# Patient Record
Sex: FEMALE | Race: WHITE | NOT HISPANIC OR LATINO | Employment: OTHER | URBAN - METROPOLITAN AREA
[De-identification: names, ages, dates, MRNs, and addresses within clinical notes are randomized per-mention and may not be internally consistent; named-entity substitution may affect disease eponyms.]

---

## 2019-05-17 ENCOUNTER — TRANSCRIBE ORDERS (OUTPATIENT)
Dept: ADMINISTRATIVE | Facility: HOSPITAL | Age: 69
End: 2019-05-17

## 2019-05-17 DIAGNOSIS — S83.241A ACUTE MEDIAL MENISCUS TEAR OF RIGHT KNEE, INITIAL ENCOUNTER: Primary | ICD-10-CM

## 2019-05-18 ENCOUNTER — HOSPITAL ENCOUNTER (OUTPATIENT)
Dept: RADIOLOGY | Facility: HOSPITAL | Age: 69
Discharge: HOME | End: 2019-05-18
Payer: MEDICARE

## 2019-05-18 DIAGNOSIS — S83.241A ACUTE MEDIAL MENISCUS TEAR OF RIGHT KNEE, INITIAL ENCOUNTER: ICD-10-CM

## 2019-05-18 PROCEDURE — 73721 MRI JNT OF LWR EXTRE W/O DYE: CPT

## 2019-06-27 ENCOUNTER — ANESTHESIA EVENT (OUTPATIENT)
Dept: GASTROENTEROLOGY | Facility: AMBULARY SURGERY CENTER | Age: 69
End: 2019-06-27

## 2019-07-12 ENCOUNTER — HOSPITAL ENCOUNTER (OUTPATIENT)
Dept: GASTROENTEROLOGY | Facility: AMBULARY SURGERY CENTER | Age: 69
Setting detail: OUTPATIENT SURGERY
Discharge: HOME | End: 2019-07-12
Attending: COLON & RECTAL SURGERY | Admitting: COLON & RECTAL SURGERY
Payer: MEDICARE

## 2019-07-12 ENCOUNTER — ANESTHESIA (OUTPATIENT)
Dept: GASTROENTEROLOGY | Facility: AMBULARY SURGERY CENTER | Age: 69
End: 2019-07-12

## 2019-07-12 VITALS
HEIGHT: 69 IN | RESPIRATION RATE: 18 BRPM | BODY MASS INDEX: 21.03 KG/M2 | WEIGHT: 142 LBS | OXYGEN SATURATION: 98 % | DIASTOLIC BLOOD PRESSURE: 79 MMHG | TEMPERATURE: 98.1 F | SYSTOLIC BLOOD PRESSURE: 162 MMHG | HEART RATE: 64 BPM

## 2019-07-12 DIAGNOSIS — Z12.11 SCREENING FOR COLON CANCER: ICD-10-CM

## 2019-07-12 PROCEDURE — 88305 TISSUE EXAM BY PATHOLOGIST: CPT | Performed by: PATHOLOGY

## 2019-07-12 PROCEDURE — 99203 OFFICE O/P NEW LOW 30 MIN: CPT | Performed by: COLON & RECTAL SURGERY

## 2019-07-12 PROCEDURE — 45384 COLONOSCOPY W/LESION REMOVAL: CPT | Performed by: COLON & RECTAL SURGERY

## 2019-07-12 PROCEDURE — 1123F ACP DISCUSS/DSCN MKR DOCD: CPT | Performed by: PATHOLOGY

## 2019-07-12 RX ORDER — PROPOFOL 10 MG/ML
INJECTION, EMULSION INTRAVENOUS AS NEEDED
Status: DISCONTINUED | OUTPATIENT
Start: 2019-07-12 | End: 2019-07-12 | Stop reason: SURG

## 2019-07-12 RX ORDER — ASCORBIC ACID 500 MG
500 TABLET ORAL DAILY
COMMUNITY

## 2019-07-12 RX ORDER — CHOLECALCIFEROL (VITAMIN D3) 125 MCG
500 CAPSULE ORAL DAILY
COMMUNITY

## 2019-07-12 RX ORDER — OMEGA-3S/DHA/EPA/FISH OIL/D3 300MG-1000
400 CAPSULE ORAL DAILY
COMMUNITY

## 2019-07-12 RX ORDER — LIDOCAINE HYDROCHLORIDE 10 MG/ML
0.5 INJECTION, SOLUTION EPIDURAL; INFILTRATION; INTRACAUDAL; PERINEURAL ONCE AS NEEDED
Status: DISCONTINUED | OUTPATIENT
Start: 2019-07-12 | End: 2019-07-16 | Stop reason: HOSPADM

## 2019-07-12 RX ORDER — SODIUM CHLORIDE, SODIUM LACTATE, POTASSIUM CHLORIDE, CALCIUM CHLORIDE 600; 310; 30; 20 MG/100ML; MG/100ML; MG/100ML; MG/100ML
125 INJECTION, SOLUTION INTRAVENOUS CONTINUOUS
Status: DISCONTINUED | OUTPATIENT
Start: 2019-07-12 | End: 2019-07-16 | Stop reason: HOSPADM

## 2019-07-12 RX ADMIN — PROPOFOL 50 MG: 10 INJECTION, EMULSION INTRAVENOUS at 08:02

## 2019-07-12 RX ADMIN — PROPOFOL 50 MG: 10 INJECTION, EMULSION INTRAVENOUS at 07:57

## 2019-07-12 RX ADMIN — PROPOFOL 30 MG: 10 INJECTION, EMULSION INTRAVENOUS at 08:16

## 2019-07-12 RX ADMIN — PROPOFOL 50 MG: 10 INJECTION, EMULSION INTRAVENOUS at 07:59

## 2019-07-12 RX ADMIN — PROPOFOL 50 MG: 10 INJECTION, EMULSION INTRAVENOUS at 08:05

## 2019-07-12 RX ADMIN — PROPOFOL 50 MG: 10 INJECTION, EMULSION INTRAVENOUS at 07:55

## 2019-07-12 RX ADMIN — SODIUM CHLORIDE, SODIUM LACTATE, POTASSIUM CHLORIDE, AND CALCIUM CHLORIDE: .6; .31; .03; .02 INJECTION, SOLUTION INTRAVENOUS at 07:17

## 2019-07-12 RX ADMIN — PROPOFOL 50 MG: 10 INJECTION, EMULSION INTRAVENOUS at 07:53

## 2019-07-12 RX ADMIN — PROPOFOL 50 MG: 10 INJECTION, EMULSION INTRAVENOUS at 08:09

## 2019-07-12 NOTE — H&P
History and Physical   Colon and Rectal Surgery   Becky Rossi 78 y o  female MRN: [id number]  Unit/Bed#:  Encounter: [id number]  07/12/19   7:44 AM      No chief complaint on file  History of Present Illness   HPI:  Becky Rossi is a 78 y o  female who presents with screening colonoscopy, last exam over 12 years ago, with normal findings         Historical Information   Past Medical History:   Diagnosis Date    Asthma     Infectious viral hepatitis     history Hep A     Past Surgical History:   Procedure Laterality Date    APPENDECTOMY      BACK SURGERY      HYSTERECTOMY      JOINT REPLACEMENT Left     x2    TONSILLECTOMY         Meds/Allergies       (Not in a hospital admission)      Current Outpatient Medications:     Apoaequorin (PREVAGEN PO), Take by mouth, Disp: , Rfl:     ascorbic acid (VITAMIN C) 500 mg tablet, Take 500 mg by mouth daily, Disp: , Rfl:     Boswellia Santos (BOSWELLIA PO), Take by mouth, Disp: , Rfl:     cholecalciferol (VITAMIN D3) 400 units tablet, Take 400 Units by mouth daily, Disp: , Rfl:     conjugated estrogens (PREMARIN) 0 625 mg tablet, Take 0 625 mg by mouth daily Take daily for 21 days then do not take for 7 days  , Disp: , Rfl:     cyanocobalamin (VITAMIN B-12) 500 mcg tablet, Take 500 mcg by mouth daily, Disp: , Rfl:     Misc Natural Products (CURCUMAX PRO PO), Take by mouth, Disp: , Rfl:     Multiple Vitamins-Minerals (SUPER OMAR MARIANO 75/BETA BRANDON PO), Take by mouth, Disp: , Rfl:     Phosphatidylserine (NEURO-PS PO), Take by mouth, Disp: , Rfl:     Current Facility-Administered Medications:     lactated ringers infusion, 125 mL/hr, Intravenous, Continuous, Lashell Jefferson MD    lidocaine (PF) (XYLOCAINE-MPF) 1 % injection 0 5 mL, 0 5 mL, Infiltration, Once PRN, Lashell Jefferson MD    Allergies   Allergen Reactions    Iodine     Tetanus Toxoids     Vancomycin Itching         Social History   Social History     Substance and Sexual Activity   Alcohol Use Yes    Frequency: 4 or more times a week    Drinks per session: 1 or 2     Social History     Substance and Sexual Activity   Drug Use Never     Social History     Tobacco Use   Smoking Status Never Smoker   Smokeless Tobacco Never Used         Family History: History reviewed  No pertinent family history  Objective     Current Vitals:   Blood Pressure: 146/75 (07/12/19 0727)  Pulse: 73 (07/12/19 0727)  Temperature: (!) 97 1 °F (36 2 °C) (07/12/19 0727)  Temp Source: Temporal (07/12/19 0727)  Respirations: 18 (07/12/19 0727)  Height: 5' 9" (175 3 cm) (07/12/19 0727)  Weight - Scale: 64 4 kg (142 lb) (07/12/19 0727)  SpO2: 97 % (07/12/19 0727)  No intake or output data in the 24 hours ending 07/12/19 0744    Physical Exam:  General: No acute distress  Eyes: Normal   ENT: Normal   Neck: No JVD  Pulm: Normal in A&P  CV: NSR no murmur  Abdomen: Soft and normal on palpation, no mass, no tenderness, no guarding  Rectal: Normal sphincter tone, no perianal skin lesions  Extremities: Normal  Lymphatics: Normal        Lab Results: I have personally reviewed pertinent lab results  Imaging: I have personally reviewed pertinent reports  Patient was consented by myself for procedure as explained earlier with all the risks and benefits described  All questions answered  ASSESSMENT:  Tangela Fuentes is a 78 y o  female who presents with screening colonoscopy, last exam over 12 years ago, with normal findings        PLAN:  colonoscopy

## 2019-07-12 NOTE — ANESTHESIA POSTPROCEDURE EVALUATION
Post-Op Assessment Note    CV Status:  Stable  Pain Score: 0    Pain management: satisfactory to patient     Mental Status:  Alert   Hydration Status:  Stable   PONV Controlled:  Controlled   Airway Patency:  Patent   Post Op Vitals Reviewed: Yes      Staff: CRNA           BP      Temp     Pulse     Resp      SpO2

## 2019-07-12 NOTE — ANESTHESIA PREPROCEDURE EVALUATION
Review of Systems/Medical History          Cardiovascular   Pulmonary  Asthma ,        GI/Hepatic    Liver disease , Hepatitis ,             Endo/Other     GYN       Hematology   Musculoskeletal       Neurology   Psychology           Physical Exam    Airway    Mallampati score: II         Dental   No notable dental hx     Cardiovascular      Pulmonary      Other Findings        Anesthesia Plan  ASA Score- 2     Anesthesia Type- IV sedation with anesthesia with ASA Monitors  Additional Monitors:   Airway Plan:     Comment: I, Dr Juliet Zhang, the attending physician, have personally seen and evaluated the patient prior to anesthetic care  I have reviewed the pre-anesthetic record, and other medical records if appropriate to the anesthetic care  If a CRNA is involved in the case, I have reviewed the CRNA assessment, if present, and agree  The patient is in a suitable condition to proceed with my formulated anesthetic plan        Plan Factors-    Induction- intravenous  Postoperative Plan-     Informed Consent- Anesthetic plan and risks discussed with patient  I personally reviewed this patient with the CRNA  Discussed and agreed on the Anesthesia Plan with the CRNA  Jackie Patel

## 2019-08-20 ENCOUNTER — EVALUATION (OUTPATIENT)
Dept: PHYSICAL THERAPY | Facility: CLINIC | Age: 69
End: 2019-08-20
Payer: MEDICARE

## 2019-08-20 ENCOUNTER — TRANSCRIBE ORDERS (OUTPATIENT)
Dept: PHYSICAL THERAPY | Facility: CLINIC | Age: 69
End: 2019-08-20

## 2019-08-20 DIAGNOSIS — Z96.651 TOTAL KNEE REPLACEMENT STATUS, RIGHT: Primary | ICD-10-CM

## 2019-08-20 DIAGNOSIS — M25.561 ACUTE PAIN OF RIGHT KNEE: ICD-10-CM

## 2019-08-20 PROCEDURE — 97162 PT EVAL MOD COMPLEX 30 MIN: CPT | Performed by: PHYSICAL THERAPIST

## 2019-08-20 PROCEDURE — 97110 THERAPEUTIC EXERCISES: CPT | Performed by: PHYSICAL THERAPIST

## 2019-08-20 NOTE — PROGRESS NOTES
PT Evaluation     Today's date: 2019  Patient name: Mahi Soto  : 1950  MRN: [id number]  Referring provider: Arabella Brown MD  Dx:   Encounter Diagnosis     ICD-10-CM    1  Total knee replacement status, right Z96 651    2  Acute pain of right knee M25 561        Start Time: 1355  Stop Time: 1440  Total time in clinic (min): 45 minutes    Assessment  Assessment details: Mahi Soto is a pleasant 78 y o  female who presents with R TKR performed on 19, who has been doing home PT prior to starting in outpatient  The patient's greatest concerns are fear of not being able to keep active  No further referral appears necessary at this time based upon examination results  Primary movement impairment diagnosis of decreased R knee flexion ROM resulting in pathoanatomical symptoms of Total knee replacement status, right  (primary encounter diagnosis)  Acute pain of right knee and limiting her ability to exercise or recreation, get off the toilet, get out of a chair, perform household chores, perform yard work, sleep, squat to  objects from the floor, stand and walk  Impairments include:  1) R knee ROM limitation  2) Decreased neuromuscular control of quad and glutes  3) Weakness in bilateral hips    Etiologic factors include poor lower extremity strength, poor hip mobility and poor balance  Discussed risks, benefits, and alternatives to treatment, and answered all patient questions to patient satisfaction    Impairments: abnormal gait, abnormal muscle firing, abnormal muscle tone, abnormal or restricted ROM, abnormal movement, impaired balance, impaired physical strength, lacks appropriate home exercise program, pain with function and poor posture   Understanding of Dx/Px/POC: good   Prognosis: good  Prognosis details: Positive prognostic indicators include positive attitude toward recovery, good understanding of diagnosis and treatment plan options and absence of observed red flags   Negative prognostic indicators include chronicity of symptoms, minimal changes in pain with movement and multiple concurrent orthopedic problems  Goals  Impairment Goals 4-6 weeks  - Decrease pain to <3/10  - Improve knee AROM to equal to the unaffected lower extremity  - Increase knee strength to 4+/5 throughout  - Increase hip strength to 4+/5 throughout    Functional Goals 6-8 weeks  - Return to Prior Level of Function  - Increase Functional Status Measure (FOTO) to: 59  - Patient will be independent with HEP  - Patient will be able to squat without increased pain/compensation/difficulty  - Patient will be able to perform sit to stand without increased pain/compensation/difficulty   - Patient will be able to ascend and descend stairs without increased pain/compensation/difficulty   - Patient will be able to return to gardening without difficulty      Plan  Plan details: Prognosis above is given PT services 2x/week tapering to 1x/week over the next 3 months and home program adherence    Patient would benefit from: skilled physical therapy  Planned modality interventions: cryotherapy, electrical stimulation/Russian stimulation, high voltage pulsed current: pain management, high voltage pulsed current: spasm management, H-Wave, TENS, thermotherapy: hydrocollator packs and unattended electrical stimulation  Planned therapy interventions: abdominal trunk stabilization, behavior modification, body mechanics training, breathing training, flexibility, functional ROM exercises, home exercise program, joint mobilization, manual therapy, massage, Long taping, muscle pump exercises, neuromuscular re-education, patient education, postural training, strengthening, stretching, therapeutic activities, therapeutic exercise, therapeutic training, gait training, transfer training, balance and balance/weight bearing training  Frequency: 2x week  Duration in weeks: 8  Treatment plan discussed with: patient        Subjective Evaluation    History of Present Illness  Mechanism of injury: WORK/SCHOOL: Retired  and   HOME LIFE: Patient lives with her  at home and 2 dogs, 3 chickens and 8 fish  2 MONSE, bed and bath on same floor but exercise room in basement and office upstairs  HOBBIES/EXERCISE: Patient enjoys gardening, playing piano, knitting, voice lessons, and a  that comes to the home  PLOF: L knee replacement in the past  Has foot drop on the R with brace at age 27  HISTORY OF CURRENT INJURY:  Conformist knee created in [place] directly based on her CT scan  Patient had R TKR on 19  Patient had home health PT for 4 visits and she did the HEP at home  Patient has been using elliptical, lunges for knee flexion, marches standing, and squats  PAIN LOCATION/DESCRIPTORS: Pain located in superior aspect of the knee, where she has a possible stitch abscess  She is washing it with dial every day and watching it closely  Pain located mostly in the medial knee and some on the lateral knee  Stiffness after prolonged positioning  AGGRAVATING FACTORS: Descending stairs, walking, getting out of a chair, and getting in and out of the car, driving, positioning at night, prolonged positioning  EASES: Biofreeze, ice  DAY PATTERN: Worst at night, is not sleeping well  Also tired by late afternoon  IMAGING: X-ray, MRI, and CT prior to surgery  SPECIAL QUESTIONS:    Carolina Huang denies a new onset of Bowel/bladder dysfunction, Nausea, Clumsy or unsteadiness, Constant night pain and History of cancer  Patient has lost 10 lbs since the surgery due to loss of appetite  PATIENT GOALS: Patient wishes to improve her motion in her knee, in order to get through daily life without discomfort     Pain  Current pain rating: 3  At best pain ratin  At worst pain ratin  Quality: dull ache and tight  Progression: improved    Patient Goals  Patient goals for therapy: decreased pain, improved balance, increased motion, increased strength and independence with ADLs/IADLs          Objective     Neurological Testing     Sensation     Knee   Left Knee   Intact: light touch    Right Knee   Intact: light touch     Active Range of Motion   Left Knee   Flexion: 128 degrees   Extension: 0 degrees     Right Knee   Flexion: 108 degrees with pain  Extension: 0 degrees     Strength/Myotome Testing     Left Hip   Planes of Motion   Flexion: 4  External rotation: 4  Internal rotation: 4    Right Hip   Planes of Motion   Flexion: 4-  External rotation: 4-  Internal rotation: 4    Left Knee   Flexion: 5  Extension: 5  Quadriceps contraction: good    Right Knee   Flexion: 4-  Extension: 4  Quadriceps contraction: fair    Left Ankle/Foot   Dorsiflexion: 5    Swelling     Left Knee Girth Measurement (cm)   Joint line: 35 5 cm  10 cm above joint line: 36 cm    Right Knee Girth Measurement (cm)   Joint line: 39 5 cm  10 cm above joint line: 39 cm    General Comments:      Lumbar Comments  Ambulation: TurboMed R sided AFO, equal stride length, no trendelenberg, minimal trunk sway  TU seconds no AD  30 sec STS: using hands 9 reps, R knee held into extension    Knee Comments  Patient with edema and slight warmth in R knee, with well healed scar with top of scar slightly red without drainage         Flowsheet Rows      Most Recent Value   PT/OT G-Codes   Current Score  45   Projected Score  64   FOTO information reviewed  Yes        Diagnosis: R TKR 19   Precautions: R foot drop, chronic, AFO   Asterisks next to exercises = provided for patient HEP   Manual Therapy        Patellar mobs        Tibiofemoral mobs        Scar massage                        Exercise Diary         Bike 5 min for ROM       Heel slides        Lunge for knee flx        Quad ramps        4 way SLR        Bridges         [facility]        Standing hip abd/ext        SLS        Gait training         Biodex        TKE Modalities        Ice         TENS

## 2019-08-20 NOTE — LETTER
2019    Logan Bustamante MD  [employer]  [facility] [address]    Patient: Tangela Fuentes   YOB: 1950   Date of Visit: 2019     Encounter Diagnosis     ICD-10-CM    1  Total knee replacement status, right Z96 651    2  Acute pain of right knee M25 561        Dear Dr Dias Tejas: Thank you for your recent referral of Tangela Fuentes  Please review the attached evaluation summary from Aurelia's recent visit  Please verify that you agree with the plan of care by signing the attached order  If you have any questions or concerns, please do not hesitate to call  I sincerely appreciate the opportunity to share in the care of one of your patients and hope to have another opportunity to work with you in the near future  Sincerely,    Silvino Cooper, PT      Referring Provider:      I certify that I have read the below Plan of Care and certify the need for these services furnished under this plan of treatment while under my care  Logan Bustamante MD  [facility] [address]  VIA Facsimile: [phone]          PT Evaluation     Today's date: 2019  Patient name: Tangela Fuentes  : 1950  MRN: [id number]  Referring provider: Wilfred Mireles MD  Dx:   Encounter Diagnosis     ICD-10-CM    1  Total knee replacement status, right Z96 651    2  Acute pain of right knee M25 561        Start Time: 1355  Stop Time: 1440  Total time in clinic (min): 45 minutes    Assessment  Assessment details: Tangela Fuentes is a pleasant 78 y o  female who presents with R TKR performed on 19, who has been doing home PT prior to starting in outpatient  The patient's greatest concerns are fear of not being able to keep active  No further referral appears necessary at this time based upon examination results      Primary movement impairment diagnosis of decreased R knee flexion ROM resulting in pathoanatomical symptoms of Total knee replacement status, right  (primary encounter diagnosis)  Acute pain of right knee and limiting her ability to exercise or recreation, get off the toilet, get out of a chair, perform household chores, perform yard work, sleep, squat to  objects from the floor, stand and walk  Impairments include:  1) R knee ROM limitation  2) Decreased neuromuscular control of quad and glutes  3) Weakness in bilateral hips    Etiologic factors include poor lower extremity strength, poor hip mobility and poor balance  Discussed risks, benefits, and alternatives to treatment, and answered all patient questions to patient satisfaction  Impairments: abnormal gait, abnormal muscle firing, abnormal muscle tone, abnormal or restricted ROM, abnormal movement, impaired balance, impaired physical strength, lacks appropriate home exercise program, pain with function and poor posture   Understanding of Dx/Px/POC: good   Prognosis: good  Prognosis details: Positive prognostic indicators include positive attitude toward recovery, good understanding of diagnosis and treatment plan options and absence of observed red flags  Negative prognostic indicators include chronicity of symptoms, minimal changes in pain with movement and multiple concurrent orthopedic problems      Goals  Impairment Goals 4-6 weeks  - Decrease pain to <3/10  - Improve knee AROM to equal to the unaffected lower extremity  - Increase knee strength to 4+/5 throughout  - Increase hip strength to 4+/5 throughout    Functional Goals 6-8 weeks  - Return to Prior Level of Function  - Increase Functional Status Measure (FOTO) to: 59  - Patient will be independent with HEP  - Patient will be able to squat without increased pain/compensation/difficulty  - Patient will be able to perform sit to stand without increased pain/compensation/difficulty   - Patient will be able to ascend and descend stairs without increased pain/compensation/difficulty   - Patient will be able to return to gardening without difficulty      Plan  Plan details: Prognosis above is given PT services 2x/week tapering to 1x/week over the next 3 months and home program adherence  Patient would benefit from: skilled physical therapy  Planned modality interventions: cryotherapy, electrical stimulation/Russian stimulation, high voltage pulsed current: pain management, high voltage pulsed current: spasm management, H-Wave, TENS, thermotherapy: hydrocollator packs and unattended electrical stimulation  Planned therapy interventions: abdominal trunk stabilization, behavior modification, body mechanics training, breathing training, flexibility, functional ROM exercises, home exercise program, joint mobilization, manual therapy, massage, Long taping, muscle pump exercises, neuromuscular re-education, patient education, postural training, strengthening, stretching, therapeutic activities, therapeutic exercise, therapeutic training, gait training, transfer training, balance and balance/weight bearing training  Frequency: 2x week  Duration in weeks: 8  Treatment plan discussed with: patient        Subjective Evaluation    History of Present Illness  Mechanism of injury: WORK/SCHOOL: Retired  and   HOME LIFE: Patient lives with her  at home and 2 dogs, 3 chickens and 8 fish  2 MONSE, bed and bath on same floor but exercise room in basement and office upstairs  HOBBIES/EXERCISE: Patient enjoys gardening, playing piano, knitting, voice lessons, and a  that comes to the home  PLOF: L knee replacement in the past  Has foot drop on the R with brace at age 27  HISTORY OF CURRENT INJURY:  Conformist knee created in [place] directly based on her CT scan  Patient had R TKR on 7/22/19  Patient had home health PT for 4 visits and she did the HEP at home   Patient has been using elliptical, lunges for knee flexion, marches standing, and squats  PAIN LOCATION/DESCRIPTORS: Pain located in superior aspect of the knee, where she has a possible stitch abscess  She is washing it with dial every day and watching it closely  Pain located mostly in the medial knee and some on the lateral knee  Stiffness after prolonged positioning  AGGRAVATING FACTORS: Descending stairs, walking, getting out of a chair, and getting in and out of the car, driving, positioning at night, prolonged positioning  EASES: Biofreeze, ice  DAY PATTERN: Worst at night, is not sleeping well  Also tired by late afternoon  IMAGING: X-ray, MRI, and CT prior to surgery  SPECIAL QUESTIONS:    Veronica White denies a new onset of Bowel/bladder dysfunction, Nausea, Clumsy or unsteadiness, Constant night pain and History of cancer  Patient has lost 10 lbs since the surgery due to loss of appetite  PATIENT GOALS: Patient wishes to improve her motion in her knee, in order to get through daily life without discomfort     Pain  Current pain rating: 3  At best pain ratin  At worst pain ratin  Quality: dull ache and tight  Progression: improved    Patient Goals  Patient goals for therapy: decreased pain, improved balance, increased motion, increased strength and independence with ADLs/IADLs          Objective     Neurological Testing     Sensation     Knee   Left Knee   Intact: light touch    Right Knee   Intact: light touch     Active Range of Motion   Left Knee   Flexion: 128 degrees   Extension: 0 degrees     Right Knee   Flexion: 108 degrees with pain  Extension: 0 degrees     Strength/Myotome Testing     Left Hip   Planes of Motion   Flexion: 4  External rotation: 4  Internal rotation: 4    Right Hip   Planes of Motion   Flexion: 4-  External rotation: 4-  Internal rotation: 4    Left Knee   Flexion: 5  Extension: 5  Quadriceps contraction: good    Right Knee   Flexion: 4-  Extension: 4  Quadriceps contraction: fair    Left Ankle/Foot   Dorsiflexion: 5    Swelling     Left Knee Girth Measurement (cm)   Joint line: 35 5 cm  10 cm above joint line: 36 cm    Right Knee Girth Measurement (cm)   Joint line: 39 5 cm  10 cm above joint line: 39 cm    General Comments:      Lumbar Comments  Ambulation: TurboMed R sided AFO, equal stride length, no trendelenberg, minimal trunk sway  TU seconds no AD  30 sec STS: using hands 9 reps, R knee held into extension    Knee Comments  Patient with edema and slight warmth in R knee, with well healed scar with top of scar slightly red without drainage         Flowsheet Rows      Most Recent Value   PT/OT G-Codes   Current Score  45   Projected Score  64   FOTO information reviewed  Yes        Diagnosis: R TKR 19   Precautions: R foot drop, chronic, AFO   Asterisks next to exercises = provided for patient HEP   Manual Therapy        Patellar mobs        Tibiofemoral mobs        Scar massage                        Exercise Diary         Bike 5 min for ROM       Heel slides        Lunge for knee flx        Quad ramps        4 way SLR        Bridges         Clamshells        Standing hip abd/ext        SLS        Gait training         Biodex        TKE                                                                        Modalities        Ice         TENS

## 2019-08-23 ENCOUNTER — OFFICE VISIT (OUTPATIENT)
Dept: PHYSICAL THERAPY | Facility: CLINIC | Age: 69
End: 2019-08-23
Payer: MEDICARE

## 2019-08-23 DIAGNOSIS — M25.561 RECURRENT PAIN OF RIGHT KNEE: ICD-10-CM

## 2019-08-23 DIAGNOSIS — M25.561 ACUTE PAIN OF RIGHT KNEE: ICD-10-CM

## 2019-08-23 DIAGNOSIS — Z96.651 TOTAL KNEE REPLACEMENT STATUS, RIGHT: Primary | ICD-10-CM

## 2019-08-23 PROCEDURE — 97110 THERAPEUTIC EXERCISES: CPT | Performed by: PHYSICAL THERAPIST

## 2019-08-23 NOTE — PROGRESS NOTES
Daily Note     Today's date: 2019  Patient name: Chang Evans  : 1950  MRN: [id number]  Referring provider: Nancy Fall MD  Dx:   Encounter Diagnosis     ICD-10-CM    1  Total knee replacement status, right Z96 651    2  Recurrent pain of right knee M25 561    3  Acute pain of right knee M25 561        Start Time: 0705  Stop Time: 0750  Total time in clinic (min): 45 minutes    Subjective: Patient reports that she had a massage yesterday and did not feel great afterwards  Objective: See treatment diary below     Diagnosis: R TKR 19   Precautions: R foot drop, chronic, AFO   Asterisks next to exercises = provided for patient HEP   Manual Therapy       Patellar mobs        Tibiofemoral mobs        Scar massage                        Exercise Diary        Bike 5 min for ROM 5 min for ROM      Heel slides  X 30 5 sec holds 120      Lunge for knee flx  2 min 5 sec holds      Quad ramps  0-100% x 10      4 way SLR        Bridges         Clamshells  Green 3x10      Standing hip abd/ext        SLS        Gait training         Biodex        TKE        Prone quad stretch  3x30 sec      Hamstring stretch chair  3x30 sec                                                      Modalities        Ice         ESTIM                  Assessment: Tolerated treatment well and was challenged by exercises, with improvement in knee flexion to 120 degrees wth heel slides  Patient demonstrated fatigue post treatment, exhibited good technique with therapeutic exercises and was provided a new HEP to progress ROM, muscle length, and quad activation  Patient demonstrates impairment of the LE and weakness due to atrophy of the quads and hamstrings  This has lead to a decline of functional activity and limited ability to complete functional activity and mobility  Examination shows the nerves to the muscles are intact and atrophy is present in these areas   The patient will benefit from a home ESTIM unit for treatment compliance in order to address muscle re-education (atrophy), weakness, and increase ROM  Plan: Continue per plan of care

## 2019-08-27 ENCOUNTER — OFFICE VISIT (OUTPATIENT)
Dept: PHYSICAL THERAPY | Facility: CLINIC | Age: 69
End: 2019-08-27
Payer: MEDICARE

## 2019-08-27 DIAGNOSIS — M25.561 RECURRENT PAIN OF RIGHT KNEE: ICD-10-CM

## 2019-08-27 DIAGNOSIS — Z96.651 TOTAL KNEE REPLACEMENT STATUS, RIGHT: Primary | ICD-10-CM

## 2019-08-27 DIAGNOSIS — M25.561 ACUTE PAIN OF RIGHT KNEE: ICD-10-CM

## 2019-08-27 PROCEDURE — 97112 NEUROMUSCULAR REEDUCATION: CPT | Performed by: PHYSICAL THERAPIST

## 2019-08-27 PROCEDURE — 97140 MANUAL THERAPY 1/> REGIONS: CPT | Performed by: PHYSICAL THERAPIST

## 2019-08-27 PROCEDURE — 97110 THERAPEUTIC EXERCISES: CPT | Performed by: PHYSICAL THERAPIST

## 2019-08-27 NOTE — PROGRESS NOTES
Daily Note     Today's date: 2019  Patient name: Keith Ulloa  : 1950  MRN: [id number]  Referring provider: Wenceslao Macedo MD  Dx:   Encounter Diagnosis     ICD-10-CM    1  Total knee replacement status, right Z96 651    2  Recurrent pain of right knee M25 561    3  Acute pain of right knee M25 561        Start Time: 0815  Stop Time: 0900  Total time in clinic (min): 45 minutes    Subjective: Patient reports her knee has been good, but she continues to get limited sleep due to discomfort  Patient has noticed some nausea and stomach pain since the surgery  Patient has some dry mouth as well  Objective: See treatment diary below    Diagnosis: R TKR 19   Precautions: R foot drop, chronic, AFO   Asterisks next to exercises = provided for patient HEP   Manual Therapy      Patellar mobs   All directions grade IV     Tibiofemoral mobs   ER in supine     Scar massage        VMO facilitation   R side x 10, improved VMO             Exercise Diary       Bike 5 min for ROM 5 min for ROM 5 min for ROM seat 15     Heel slides  X 30 5 sec holds 120 X 30 5 sec holds 124     Lunge for knee flx  2 min 5 sec holds      Quad ramps  0-100% x 10 0-100% 2x10     4 way SLR*   2x10 with quad set     Bridges         Clamshells*  Green 3x10      Standing hip abd/ext        SLS        Gait training         Biodex        TKE*   All on wall 2x15 5 sec hold     Prone quad stretch*  3x30 sec      Hamstring stretch chair  3x30 sec                                                      Modalities        Ice         ESTIM                    Assessment: Tolerated treatment well and was able to improve knee flx ROM to 124 during heel slides  Patient continues to struggle with quad ramps when decreasing activation from 50 to 25 to 0%  Patient demonstrated fatigue post treatment and demonstrate improved VMO activation following facilitation and will perform ball press into wall for carryover      Plan: Progress treatment as tolerated

## 2019-08-30 ENCOUNTER — OFFICE VISIT (OUTPATIENT)
Dept: PHYSICAL THERAPY | Facility: CLINIC | Age: 69
End: 2019-08-30
Payer: MEDICARE

## 2019-08-30 DIAGNOSIS — Z96.651 TOTAL KNEE REPLACEMENT STATUS, RIGHT: Primary | ICD-10-CM

## 2019-08-30 DIAGNOSIS — M25.561 ACUTE PAIN OF RIGHT KNEE: ICD-10-CM

## 2019-08-30 DIAGNOSIS — M25.561 RECURRENT PAIN OF RIGHT KNEE: ICD-10-CM

## 2019-08-30 PROCEDURE — 97112 NEUROMUSCULAR REEDUCATION: CPT | Performed by: PHYSICAL THERAPIST

## 2019-08-30 PROCEDURE — 97140 MANUAL THERAPY 1/> REGIONS: CPT | Performed by: PHYSICAL THERAPIST

## 2019-08-30 PROCEDURE — 97110 THERAPEUTIC EXERCISES: CPT | Performed by: PHYSICAL THERAPIST

## 2019-08-30 NOTE — PROGRESS NOTES
Daily Note     Today's date: 2019  Patient name: Fredia Fleischer  : 1950  MRN: [id number]  Referring provider: Santiago Ford MD  Dx:   Encounter Diagnosis     ICD-10-CM    1  Total knee replacement status, right Z96 651    2  Recurrent pain of right knee M25 561    3  Acute pain of right knee M25 561        Start Time: 0700  Stop Time: 0748  Total time in clinic (min): 48 minutes    Subjective: Patient reports that she is still not sleeping well, and is hoping the new ESTIM unit will help her with this  Objective: See treatment diary below    Diagnosis: R TKR 19   Precautions: R foot drop, chronic, AFO   Asterisks next to exercises = provided for patient HEP   Manual Therapy     Patellar mobs   All directions grade IV     Tibiofemoral mobs   ER in supine     Scar massage        VMO facilitation   R side x 10, improved VMO             Exercise Diary      Bike 5 min for ROM 5 min for ROM 5 min for ROM seat 15 5 min for ROM seat    Heel slides  X 30 5 sec holds 120 X 30 5 sec holds 124 X 30 5 sec holds OP for 10 125 degrees, with ESTIM unit in place x 10 128 degrees    Lunge for knee flx  2 min 5 sec holds      Quad ramps  0-100% x 10 0-100% 2x10 0-100% 2x10    4 way SLR*   2x10 with quad set     Bridges         Clamshells*  Green 3x10      Standing hip abd/ext    2x10 marvin abd and ext, standing on 2" step for ext    SLS        Gait training         Biodex        TKE*   All on wall 2x15 5 sec hold     Prone quad stretch*  3x30 sec      Hamstring stretch chair  3x30 sec      Step ups    Stepping down with L 4" x 10, 6" x 5    Squats                                                                ESTIM unit education    Performed     RE-evaluation        Modalities        Ice         ESTIM    10 min during heel slides                Assessment: Tolerated treatment well and was able to improve knee flx ROM to 125 this date   ESTIM unit provided this date and education performed about its use and settings  Patient trialed the unit this date with positive results, improving knee flx ROM by 3 degrees with less pain during heel slides  Patient demonstrated fatigue post treatment and exhibited good technique with therapeutic exercises  Added hip abd/ext for home  Plan: Progress treatment as tolerated

## 2019-09-03 ENCOUNTER — OFFICE VISIT (OUTPATIENT)
Dept: PHYSICAL THERAPY | Facility: CLINIC | Age: 69
End: 2019-09-03
Payer: MEDICARE

## 2019-09-03 DIAGNOSIS — M25.561 ACUTE PAIN OF RIGHT KNEE: ICD-10-CM

## 2019-09-03 DIAGNOSIS — M25.561 RECURRENT PAIN OF RIGHT KNEE: ICD-10-CM

## 2019-09-03 DIAGNOSIS — Z96.651 TOTAL KNEE REPLACEMENT STATUS, RIGHT: Primary | ICD-10-CM

## 2019-09-03 PROCEDURE — 97110 THERAPEUTIC EXERCISES: CPT | Performed by: PHYSICAL THERAPIST

## 2019-09-03 PROCEDURE — 97112 NEUROMUSCULAR REEDUCATION: CPT | Performed by: PHYSICAL THERAPIST

## 2019-09-03 PROCEDURE — 97140 MANUAL THERAPY 1/> REGIONS: CPT | Performed by: PHYSICAL THERAPIST

## 2019-09-03 NOTE — PROGRESS NOTES
Daily Note     Today's date: 9/3/2019  Patient name: Kellen Galloway  : 1950  MRN: [id number]  Referring provider: Kody Chavarria MD  Dx:   Encounter Diagnosis     ICD-10-CM    1  Total knee replacement status, right Z96 651    2  Recurrent pain of right knee M25 561    3  Acute pain of right knee M25 561        Start Time: 0900  Stop Time: 09  Total time in clinic (min): 42 minutes    Subjective: Patient reports she is "coming along" and is using the ESTIM machine often       Objective: See treatment diary below     Diagnosis: R TKR 19   Precautions: R foot drop, chronic, AFO   Asterisks next to exercises = provided for patient HEP   Manual Therapy 8/20 8/23 8/27 8/30 9/3   Patellar mobs   All directions grade IV  All directions grade IV   Tibiofemoral mobs   ER in supine  ER in supinea   Scar massage        VMO facilitation   R side x 10, improved VMO             Exercise Diary  8/20 8/23 8/27 8/30 9/3   Bike 5 min for ROM 5 min for ROM 5 min for ROM seat 15 5 min for ROM 6 min for ROM seat 13   Heel slides  X 30 5 sec holds 120 X 30 5 sec holds 124 X 30 5 sec holds OP for 10 125 degrees, with ESTIM unit in place x 10 128 degrees X 30 5 sec holds with OP, 125 degrees   Lunge for knee flx  2 min 5 sec holds      Quad ramps  0-100% x 10 0-100% 2x10 0-100% 2x10    4 way SLR*   2x10 with quad set  2x10 with quad set   Bridges      2x10 with TA   Clamshells*  Green 3x10      Standing hip abd/ext    2x10 marvin abd and ext, standing on 2" step for ext    SLS        Gait training         Biodex        TKE*   All on wall 2x15 5 sec hold  With green TB x 30 5 sec holds   Prone quad stretch*  3x30 sec      Hamstring stretch chair  3x30 sec      Step ups    Stepping down with L 4" x 10, 6" x 5 Stepping down 6" x 10   Squats                                                                ESTIM unit education    Performed     RE-evaluation        Modalities        Ice         ESTIM    10 min during heel slides 10 min during heel slides             Assessment: Tolerated treatment well and demonstrated slightly decreased ROM into R knee flx this date  Patient demonstrated fatigue post treatment and exhibited good technique with therapeutic exercises  Patient would benefit from attempt at prone knee flx to improve ROM next visit  FOTO retaken this date with improvements demonstrating progress towards her goals  Plan: Continue per plan of care

## 2019-09-06 ENCOUNTER — OFFICE VISIT (OUTPATIENT)
Dept: PHYSICAL THERAPY | Facility: CLINIC | Age: 69
End: 2019-09-06
Payer: MEDICARE

## 2019-09-06 DIAGNOSIS — M25.561 RECURRENT PAIN OF RIGHT KNEE: Primary | ICD-10-CM

## 2019-09-06 DIAGNOSIS — Z96.651 TOTAL KNEE REPLACEMENT STATUS, RIGHT: ICD-10-CM

## 2019-09-06 DIAGNOSIS — M25.561 ACUTE PAIN OF RIGHT KNEE: ICD-10-CM

## 2019-09-06 PROCEDURE — 97112 NEUROMUSCULAR REEDUCATION: CPT | Performed by: PHYSICAL THERAPIST

## 2019-09-06 PROCEDURE — 97140 MANUAL THERAPY 1/> REGIONS: CPT | Performed by: PHYSICAL THERAPIST

## 2019-09-06 PROCEDURE — 97110 THERAPEUTIC EXERCISES: CPT | Performed by: PHYSICAL THERAPIST

## 2019-09-06 NOTE — PROGRESS NOTES
Daily Note     Today's date: 2019  Patient name: Romie Marcial  : 1950  MRN: [id number]  Referring provider: Corie Luke MD  Dx:   Encounter Diagnosis     ICD-10-CM    1  Recurrent pain of right knee M25 561    2  Total knee replacement status, right Z96 651    3  Acute pain of right knee M25 561        Start Time: 0700  Stop Time: 0743  Total time in clinic (min): 43 minutes    Subjective: Patient reports poor sleep last night secondary to knee pain  However, she reports that overall she continues to experience improvement in pain levels, mobility, and ability to complete ADLs  Objective: See treatment diary below      Assessment: Patient tolerated treatment well with no aggravation of pain reported following today's session  Light resistance initiated with standing hip PREs, which was good challenge for patient, and she was able to maintain good technique with use of hand rail and min verbal cueing  With HHA x1 patient demonstrates good eccentric control when lowering from 6" step  However, patient does demonstrate mild hip compensation during TKE and noted fatigue with SLR  Continue to address proximal hip strengthening to further improve WBing tolerance  Plan: Continue per plan of care        Diagnosis: R TKR 19   Precautions: R foot drop, chronic, AFO   Asterisks next to exercises = provided for patient HEP   Manual Therapy 9/6  8/27 8/30 9/3   Patellar mobs All directions gr IV  All directions grade IV  All directions grade IV   Tibiofemoral mobs ER in supine  ER in supine  ER in supinea   Scar massage        VMO facilitation   R side x 10, improved VMO             Exercise Diary   93   Bike 5 min for ROM  5 min for ROM seat 15 5 min for ROM 6 min for ROM seat 13   Heel slides 30x5" with strap OP      123 deg at last rep  X 30 5 sec holds 124 X 30 5 sec holds OP for 10 125 degrees, with ESTIM unit in place x 10 128 degrees X 30 5 sec holds with OP, 125 degrees Lunge for knee flx        Quad ramps   0-100% 2x10 0-100% 2x10    4 way SLR* 2x10 flex with quad set  2x10 with quad set  2x10 with quad set   Bridges      2x10 with TA   Clamshells*        Standing hip abd/ext 2x10 ea on 2" step; YTB   2x10 marvin abd and ext, standing on 2" step for ext    SLS        Gait training         Biodex        TKE* GTB 30x5"  All on wall 2x15 5 sec hold  With green TB x 30 5 sec holds   Prone quad stretch*        Hamstring stretch chair        Step ups Step downs 10x 6" step   Stepping down with L 4" x 10, 6" x 5 Stepping down 6" x 10   Squats                                                                ESTIM unit education    Performed     RE-evaluation        Modalities        Ice         ESTIM nv   10 min during heel slides 10 min during heel slides

## 2019-09-09 ENCOUNTER — OFFICE VISIT (OUTPATIENT)
Dept: PHYSICAL THERAPY | Facility: CLINIC | Age: 69
End: 2019-09-09
Payer: MEDICARE

## 2019-09-09 DIAGNOSIS — M25.561 RECURRENT PAIN OF RIGHT KNEE: ICD-10-CM

## 2019-09-09 DIAGNOSIS — Z96.651 TOTAL KNEE REPLACEMENT STATUS, RIGHT: Primary | ICD-10-CM

## 2019-09-09 DIAGNOSIS — M25.561 ACUTE PAIN OF RIGHT KNEE: ICD-10-CM

## 2019-09-09 PROCEDURE — 97112 NEUROMUSCULAR REEDUCATION: CPT | Performed by: PHYSICAL THERAPIST

## 2019-09-09 PROCEDURE — 97110 THERAPEUTIC EXERCISES: CPT | Performed by: PHYSICAL THERAPIST

## 2019-09-09 PROCEDURE — 97140 MANUAL THERAPY 1/> REGIONS: CPT | Performed by: PHYSICAL THERAPIST

## 2019-09-09 NOTE — PROGRESS NOTES
Daily Note     Today's date: 2019  Patient name: Belinda Nicholas  : 1950  MRN: [id number]  Referring provider: Keshia Stephenson MD  Dx:   Encounter Diagnosis     ICD-10-CM    1  Total knee replacement status, right Z96 651    2  Recurrent pain of right knee M25 561    3  Acute pain of right knee M25 561        Start Time: 0700  Stop Time: 0745  Total time in clinic (min): 45 minutes    Subjective: Patient reports that her pain has moved to anterior knee instead of medial knee, and she was tired last night  Objective: See treatment diary below    Assessment: Tolerated treatment well and was able to improve knee flx to 128 with OP  Patient progressed hip strengtheing this date with hip add and abd SLR  Patient demonstrated fatigue post treatment and exhibited good technique with therapeutic exercises  Patient encouraged to start hip abd and add SLR strengthening at home  Plan: Continue per plan of care        Diagnosis: R TKR 19   Precautions: R foot drop, chronic, AFO   Asterisks next to exercises = provided for patient HEP   Manual Therapy 9/6 9/9 8/27 8/30 9/3   Patellar mobs All directions gr IV Inferior in varying degrees of flexion All directions grade IV  All directions grade IV   Tibiofemoral mobs ER in supine ER in hooklying ER in supine  ER in supinea   Scar massage        VMO facilitation   R side x 10, improved VMO             Exercise Diary   9/3   Bike 5 min for ROM 5 min for ROM 5 min for ROM seat 15 5 min for ROM 6 min for ROM seat 13   Heel slides 30x5" with strap OP      123 deg at last rep 3x10 5 sec holds with OP all reps    128 degrees  X 30 5 sec holds 124 X 30 5 sec holds OP for 10 125 degrees, with ESTIM unit in place x 10 128 degrees X 30 5 sec holds with OP, 125 degrees   Lunge for knee flx        Quad ramps  0-110% x 10 with focus on 25-0% 0-100% 2x10 0-100% 2x10    4 way SLR* 2x10 flex with quad set 2x10 with quad set, 2x10 hip abd and add on R only 2x10 with quad set  2x10 with quad set   Bridges      2x10 with TA   Clamshells*        Standing hip abd/ext 2x10 ea on 2" step; YTB   2x10 marvin abd and ext, standing on 2" step for ext    SLS        Gait training         Biodex        TKE* GTB 30x5" BTB 5" holds x 30 All on wall 2x15 5 sec hold  With green TB x 30 5 sec holds   Prone quad stretch*        Hamstring stretch chair        Step ups Step downs 10x 6" step   Stepping down with L 4" x 10, 6" x 5 Stepping down 6" x 10   Squats                                                                ESTIM unit education    Performed     RE-evaluation        Modalities        Ice         ESTIM nv np  10 min during heel slides 10 min during heel slides

## 2019-09-13 ENCOUNTER — OFFICE VISIT (OUTPATIENT)
Dept: PHYSICAL THERAPY | Facility: CLINIC | Age: 69
End: 2019-09-13
Payer: MEDICARE

## 2019-09-13 DIAGNOSIS — M25.561 RECURRENT PAIN OF RIGHT KNEE: ICD-10-CM

## 2019-09-13 DIAGNOSIS — M25.561 ACUTE PAIN OF RIGHT KNEE: ICD-10-CM

## 2019-09-13 DIAGNOSIS — Z96.651 TOTAL KNEE REPLACEMENT STATUS, RIGHT: Primary | ICD-10-CM

## 2019-09-13 PROCEDURE — 97140 MANUAL THERAPY 1/> REGIONS: CPT | Performed by: PHYSICAL THERAPIST

## 2019-09-13 PROCEDURE — 97112 NEUROMUSCULAR REEDUCATION: CPT | Performed by: PHYSICAL THERAPIST

## 2019-09-13 PROCEDURE — 97110 THERAPEUTIC EXERCISES: CPT | Performed by: PHYSICAL THERAPIST

## 2019-09-13 NOTE — PROGRESS NOTES
Daily Note     Today's date: 2019  Patient name: Shaila Acosta  : 1950  MRN: [id number]  Referring provider: Nico Jansen MD  Dx:   Encounter Diagnosis     ICD-10-CM    1  Total knee replacement status, right Z96 651    2  Recurrent pain of right knee M25 561    3  Acute pain of right knee M25 561        Start Time:   Stop Time: 07  Total time in clinic (min): 48 minutes    Subjective: Patient reports she took a day off of the exercise yesterday as she had company over, and she is not sleeping well still which makes her tired  Objective: See treatment diary below    Assessment: Tolerated treatment well and continues to demonstrate improved ROM with OP into knee flx  Patient requires more reps with neuromuscular control of R quad to improve overall function during daily activities  Patient demonstrated fatigue post treatment and exhibited good technique with therapeutic exercises  Plan: Progress note during next visit        Diagnosis: R TKR 19   Precautions: R foot drop, chronic, AFO   Asterisks next to exercises = provided for patient HEP   Manual Therapy 9/6 9/9 9/13 8/30 9/3   Patellar mobs All directions gr IV Inferior in varying degrees of flexion Inferior in flx, all directions with leg straight  All directions grade IV   Tibiofemoral mobs ER in supine ER in hooklying ER in hooklying  ER in supinea   Scar massage        VMO facilitation                Exercise Diary   93   Alter G   NV     Bike 5 min for ROM 5 min for ROM 5 min for ROM 5 min for ROM 6 min for ROM seat 13   Heel slides 30x5" with strap OP      123 deg at last rep 3x10 5 sec holds with OP all reps    128 degrees  3x10 5 sec holds with OP 2x10  129 degrees  X 30 5 sec holds OP for 10 125 degrees, with ESTIM unit in place x 10 128 degrees X 30 5 sec holds with OP, 125 degrees   Lunge for knee flx        Quad ramps  0-100% x 10 with focus on 25-0% Ramping down only 100%-0% x 20 0-100% 2x10    4 way SLR* 2x10 flex with quad set 2x10 with quad set, 2x10 hip abd and add on R only   2x10 with quad set   Bridges    With ball 3x10  2x10 with TA   Clamshells*        Standing hip abd/ext 2x10 ea on 2" step; YTB   2x10 marvin abd and ext, standing on 2" step for ext    SLS        Gait training         Biodex        TKE* GTB 30x5" BTB 5" holds x 30 Ruth 16# 5 sec holds x 30  With green TB x 30 5 sec holds   Prone quad stretch*   3x30 sec     Hamstring stretch chair        Step ups Step downs 10x 6" step  Step downs x 20 off of ^" step Stepping down with L 4" x 10, 6" x 5 Stepping down 6" x 10   Squats        Lateral step downs   2" step 2x10                                                     ESTIM unit education    Performed     RE-evaluation        Modalities        Ice         ESTIM nv np  10 min during heel slides 10 min during heel slides

## 2019-09-16 ENCOUNTER — EVALUATION (OUTPATIENT)
Dept: PHYSICAL THERAPY | Facility: CLINIC | Age: 69
End: 2019-09-16
Payer: MEDICARE

## 2019-09-16 ENCOUNTER — TRANSCRIBE ORDERS (OUTPATIENT)
Dept: PHYSICAL THERAPY | Facility: CLINIC | Age: 69
End: 2019-09-16

## 2019-09-16 DIAGNOSIS — M25.561 ACUTE PAIN OF RIGHT KNEE: ICD-10-CM

## 2019-09-16 DIAGNOSIS — Z96.651 TOTAL KNEE REPLACEMENT STATUS, RIGHT: Primary | ICD-10-CM

## 2019-09-16 DIAGNOSIS — M25.561 RECURRENT PAIN OF RIGHT KNEE: ICD-10-CM

## 2019-09-16 PROCEDURE — 97112 NEUROMUSCULAR REEDUCATION: CPT | Performed by: PHYSICAL THERAPIST

## 2019-09-16 PROCEDURE — 97110 THERAPEUTIC EXERCISES: CPT | Performed by: PHYSICAL THERAPIST

## 2019-09-16 NOTE — PROGRESS NOTES
PT Re-Evaluation     Today's date: 2019  Patient name: Fredia Fleischer  : 1950  MRN: [id number]  Referring provider: Santiago Ford MD  Dx:   Encounter Diagnosis     ICD-10-CM    1  Total knee replacement status, right Z96 651    2  Recurrent pain of right knee M25 561    3  Acute pain of right knee M25 561        Start Time: 06  Stop Time: 0745  Total time in clinic (min): 46 minutes    Assessment  Assessment details: Fredia Fleischer is a pleasant 78 y o  female who presents to re-evaluation with R TKR performed on 19  Patient has made progress with R knee ROM and strength, but continues to have decreased hip and knee strength, decreased knee flx ROM, and decreased hip strength that limit her ability to exercise, walk prolonged distances, squat, sit to stand, perform gardening, and kneel  Primary movement impairment diagnosis of decreased R hip and knee strength resulting in pathoanatomical symptoms of Total knee replacement status, right  (primary encounter diagnosis)  Impairments include:  1) Limited R knee flx ROM  2) Decreased neuromuscular control of quad  3) Weakness in bilateral hips and R hamstring    Etiologic factors include poor lower extremity strength, poor hip mobility and poor balance  Discussed risks, benefits, and alternatives to treatment, and answered all patient questions to patient satisfaction  Impairments: abnormal gait, abnormal muscle firing, abnormal muscle tone, abnormal or restricted ROM, abnormal movement, impaired balance, impaired physical strength, lacks appropriate home exercise program, pain with function and poor posture   Understanding of Dx/Px/POC: good   Prognosis: good  Prognosis details: Positive prognostic indicators include positive attitude toward recovery, good understanding of diagnosis and treatment plan options and absence of observed red flags    Negative prognostic indicators include chronicity of symptoms, minimal changes in pain with movement and multiple concurrent orthopedic problems  Goals  Impairment Goals 4-6 weeks  - Decrease pain to <3/10 - partially met  - Improve knee AROM to equal to the unaffected lower extremity - partially met  - Increase knee strength to 4+/5 throughout - partially met  - Increase hip strength to 4+/5 throughout - partially met    Functional Goals 6-8 weeks  - Return to Prior Level of Function - partially met  - Increase Functional Status Measure (FOTO) to: 59 - partially met  - Patient will be independent with HEP - met  - Patient will be able to squat without increased pain/compensation/difficulty - met  - Patient will be able to perform sit to stand without increased pain/compensation/difficulty - met  - Patient will be able to ascend and descend stairs without increased pain/compensation/difficulty - met  - Patient will be able to return to gardening without difficulty - not met      Plan  Plan details: Prognosis above is given PT services 2x/week tapering to 1x/week over the next 2 months and home program adherence    Patient would benefit from: skilled physical therapy  Planned modality interventions: cryotherapy, electrical stimulation/Russian stimulation, high voltage pulsed current: pain management, high voltage pulsed current: spasm management, H-Wave, TENS, thermotherapy: hydrocollator packs and unattended electrical stimulation  Planned therapy interventions: abdominal trunk stabilization, behavior modification, body mechanics training, breathing training, flexibility, functional ROM exercises, home exercise program, joint mobilization, manual therapy, massage, Long taping, muscle pump exercises, neuromuscular re-education, patient education, postural training, strengthening, stretching, therapeutic activities, therapeutic exercise, therapeutic training, gait training, transfer training, balance and balance/weight bearing training  Frequency: 2x week  Duration in weeks: 8  Treatment plan discussed with: patient        Subjective Evaluation    History of Present Illness  Mechanism of injury: WORK/SCHOOL: Retired  and   HOME LIFE: Patient lives with her  at home and 2 dogs, 3 chickens and 8 fish  2 MONSE, bed and bath on same floor but exercise room in basement and office upstairs  HOBBIES/EXERCISE: Patient enjoys gardening, playing piano, knitting, voice lessons, and a  that comes to the home  PLOF: L knee replacement in the past  Has foot drop on the R with brace at age 27  Re-evaluation:  HISTORY OF CURRENT INJURY:  Patient reports that her R knee is getting better during the day, and she continues to have disturbed sleep due to discomfort  She is having to ice it at night  She notices she is able to do things easier during the day, including stairs, standing longer, and walking  PAIN LOCATION/DESCRIPTORS: Pain located in superior aspect of the knee, where she has a possible stitch abscess  She is washing it with dial every day and watching it closely  Pain located mostly in the medial knee and some on the lateral knee  Stiffness after prolonged positioning  AGGRAVATING FACTORS: Positioning at night, driving (knee, but foot brace remains a barrier)  Improved: Descending stairs, walking, getting out of a chair, and getting in and out of the car, prolonged positioning  EASES: Biofreeze, ice  DAY PATTERN: Worst at night, is not sleeping well  Also tired by late afternoon  IMAGING: X-ray, MRI, and CT prior to surgery  SPECIAL QUESTIONS:    Evelina Julien denies a new onset of Bowel/bladder dysfunction, Nausea, Clumsy or unsteadiness, Constant night pain and History of cancer  Patient has lost 10 lbs since the surgery due to loss of appetite  PATIENT GOALS: Patient wishes to improve her motion in her knee, in order to get through daily life without discomfort   - Patient reports she feels 70% improvement, and wishes to strengthen her hips and quad and improve discomfort during the night  Patient would like to bend her knee without significant pain  Pain  Current pain ratin  At best pain ratin  At worst pain ratin  Quality: dull ache, tight and discomfort  Progression: improved    Patient Goals  Patient goals for therapy: decreased pain, improved balance, increased motion, increased strength and independence with ADLs/IADLs          Objective     Neurological Testing     Sensation     Knee   Left Knee   Intact: light touch    Right Knee   Intact: light touch     Active Range of Motion   Left Knee   Flexion: 128 degrees   Extension: 0 degrees     Right Knee   Flexion: 125 degrees with pain  Extension: 0 degrees     Additional Active Range of Motion Details  Quad set    Strength/Myotome Testing     Left Hip   Planes of Motion   Flexion: 4-  External rotation: 4  Internal rotation: 4+    Right Hip   Planes of Motion   Flexion: 4  External rotation: 4-  Internal rotation: 4+    Left Knee   Flexion: 5  Extension: 5  Quadriceps contraction: good    Right Knee   Flexion: 4  Extension: 4+  Quadriceps contraction: good    Left Ankle/Foot   Dorsiflexion: 5    Additional Strength Details  Improved control of quad ramps    Swelling     Left Knee Girth Measurement (cm)   Joint line: 35 5 cm  10 cm above joint line: 36 cm    Right Knee Girth Measurement (cm)   Joint line: 37 5 cm  10 cm above joint line: 38 cm    General Comments:      Lumbar Comments  Ambulation: TurboMed R sided AFO, equal stride length, no trendelenberg, minimal trunk sway  TU seconds no AD  30 sec STS: no hands momentum strategy, 10 reps    Knee Comments  Patient with scar well healed, with no remaining redness  Edema still present in knee           Diagnosis: R TKR 19   Precautions: R foot drop, chronic, AFO   Asterisks next to exercises = provided for patient HEP   Manual Therapy 9/6 9/9 9/13 9/16 9/3   Patellar mobs All directions gr IV Inferior in varying degrees of flexion Inferior in flx, all directions with leg straight  All directions grade IV   Tibiofemoral mobs ER in supine ER in hooklying ER in hooklying  ER in supinea   Scar massage        VMO facilitation                Exercise Diary  9/6 9/9 9/13 9/16 9/3   Alter G   NV     Bike 5 min for ROM 5 min for ROM 5 min for ROM 5 min for ROM 6 min for ROM seat 13   Heel slides 30x5" with strap OP      123 deg at last rep 3x10 5 sec holds with OP all reps    128 degrees  3x10 5 sec holds with OP 2x10  129 degrees  3x10 5 sec holds no OP X 30 5 sec holds with OP, 125 degrees   Lunge for knee flx        Quad ramps  0-100% x 10 with focus on 25-0% Ramping down only 100%-0% x 20     4 way SLR* 2x10 flex with quad set 2x10 with quad set, 2x10 hip abd and add on R only   2x10 with quad set   Bridges    With ball 3x10  2x10 with TA   Clamshells*        Standing hip abd/ext 2x10 ea on 2" step; YTB       SLS        Gait training         Biodex        TKE* GTB 30x5" BTB 5" holds x 30 [place] 16# 5 sec holds x 30  With green TB x 30 5 sec holds   Prone quad stretch*   3x30 sec     Hamstring stretch chair        Step ups Step downs 10x 6" step  Step downs x 20 off of ^" step  Stepping down 6" x 10   Squats        Lateral step downs   2" step 2x10     Hip flx EOT    Black TB 3x10    HS curls in standing    3x10 3# ea    HS stool pulls        Marches with weight    3x10 3#                    ESTIM unit education        RE-evaluation    Performed    Modalities        Ice         ESTIM nv np   10 min during heel slides

## 2019-09-16 NOTE — LETTER
2019    Shobha Graves MD  [email or web address]  [place] [id number]    Patient: Lizz Crowder   YOB: 1950   Date of Visit: 2019     Encounter Diagnosis     ICD-10-CM    1  Total knee replacement status, right Z96 651    2  Recurrent pain of right knee M25 561    3  Acute pain of right knee M25 561        Dear Dr Barros Spotted: Thank you for your recent referral of Lizz Crowder  Please review the attached evaluation summary from Aurelia's recent visit  Please verify that you agree with the plan of care by signing the attached order  If you have any questions or concerns, please do not hesitate to call  I sincerely appreciate the opportunity to share in the care of one of your patients and hope to have another opportunity to work with you in the near future  Sincerely,    [place] Issa, PT      Referring Provider:      I certify that I have read the below Plan of Care and certify the need for these services furnished under this plan of treatment while under my care  Shobha Graves MD  [email or web address]  [facility] [place] 1036: [phone]          PT Re-Evaluation     Today's date: 2019  Patient name: Lizz Crowder  : 1950  MRN: [id number]  Referring provider: Maria De Jesus Bird MD  Dx:   Encounter Diagnosis     ICD-10-CM    1  Total knee replacement status, right Z96 651    2  Recurrent pain of right knee M25 561    3  Acute pain of right knee M25 561        Start Time: 0659  Stop Time: 0745  Total time in clinic (min): 46 minutes    Assessment  Assessment details: Lizz Crowder is a pleasant 78 y o  female who presents to re-evaluation with R TKR performed on 19   Patient has made progress with R knee ROM and strength, but continues to have decreased hip and knee strength, decreased knee flx ROM, and decreased hip strength that limit her ability to exercise, walk prolonged distances, squat, sit to stand, perform gardening, and kneel  Primary movement impairment diagnosis of decreased R hip and knee strength resulting in pathoanatomical symptoms of Total knee replacement status, right  (primary encounter diagnosis)  Impairments include:  1) Limited R knee flx ROM  2) Decreased neuromuscular control of quad  3) Weakness in bilateral hips and R hamstring    Etiologic factors include poor lower extremity strength, poor hip mobility and poor balance  Discussed risks, benefits, and alternatives to treatment, and answered all patient questions to patient satisfaction  Impairments: abnormal gait, abnormal muscle firing, abnormal muscle tone, abnormal or restricted ROM, abnormal movement, impaired balance, impaired physical strength, lacks appropriate home exercise program, pain with function and poor posture   Understanding of Dx/Px/POC: good   Prognosis: good  Prognosis details: Positive prognostic indicators include positive attitude toward recovery, good understanding of diagnosis and treatment plan options and absence of observed red flags  Negative prognostic indicators include chronicity of symptoms, minimal changes in pain with movement and multiple concurrent orthopedic problems      Goals  Impairment Goals 4-6 weeks  - Decrease pain to <3/10 - partially met  - Improve knee AROM to equal to the unaffected lower extremity - partially met  - Increase knee strength to 4+/5 throughout - partially met  - Increase hip strength to 4+/5 throughout - partially met    Functional Goals 6-8 weeks  - Return to Prior Level of Function - partially met  - Increase Functional Status Measure (FOTO) to: 59 - partially met  - Patient will be independent with HEP - met  - Patient will be able to squat without increased pain/compensation/difficulty - met  - Patient will be able to perform sit to stand without increased pain/compensation/difficulty - met  - Patient will be able to ascend and descend stairs without increased pain/compensation/difficulty - met  - Patient will be able to return to gardening without difficulty - not met      Plan  Plan details: Prognosis above is given PT services 2x/week tapering to 1x/week over the next 2 months and home program adherence  Patient would benefit from: skilled physical therapy  Planned modality interventions: cryotherapy, electrical stimulation/Russian stimulation, high voltage pulsed current: pain management, high voltage pulsed current: spasm management, H-Wave, TENS, thermotherapy: hydrocollator packs and unattended electrical stimulation  Planned therapy interventions: abdominal trunk stabilization, behavior modification, body mechanics training, breathing training, flexibility, functional ROM exercises, home exercise program, joint mobilization, manual therapy, massage, Long taping, muscle pump exercises, neuromuscular re-education, patient education, postural training, strengthening, stretching, therapeutic activities, therapeutic exercise, therapeutic training, gait training, transfer training, balance and balance/weight bearing training  Frequency: 2x week  Duration in weeks: 8  Treatment plan discussed with: patient        Subjective Evaluation    History of Present Illness  Mechanism of injury: WORK/SCHOOL: Retired  and   HOME LIFE: Patient lives with her  at home and 2 dogs, 3 chickens and 8 fish  2 MONSE, bed and bath on same floor but exercise room in basement and office upstairs  HOBBIES/EXERCISE: Patient enjoys gardening, playing piano, knitting, voice lessons, and a  that comes to the home  PLOF: L knee replacement in the past  Has foot drop on the R with brace at age 27  Re-evaluation:  HISTORY OF CURRENT INJURY:  Patient reports that her R knee is getting better during the day, and she continues to have disturbed sleep due to discomfort  She is having to ice it at night   She notices she is able to do things easier during the day, including stairs, standing longer, and walking  PAIN LOCATION/DESCRIPTORS: Pain located in superior aspect of the knee, where she has a possible stitch abscess  She is washing it with dial every day and watching it closely  Pain located mostly in the medial knee and some on the lateral knee  Stiffness after prolonged positioning  AGGRAVATING FACTORS: Positioning at night, driving (knee, but foot brace remains a barrier)  Improved: Descending stairs, walking, getting out of a chair, and getting in and out of the car, prolonged positioning  EASES: Biofreeze, ice  DAY PATTERN: Worst at night, is not sleeping well  Also tired by late afternoon  IMAGING: X-ray, MRI, and CT prior to surgery  SPECIAL QUESTIONS:    Weston Abdalla denies a new onset of Bowel/bladder dysfunction, Nausea, Clumsy or unsteadiness, Constant night pain and History of cancer  Patient has lost 10 lbs since the surgery due to loss of appetite  PATIENT GOALS: Patient wishes to improve her motion in her knee, in order to get through daily life without discomfort  - Patient reports she feels 70% improvement, and wishes to strengthen her hips and quad and improve discomfort during the night  Patient would like to bend her knee without significant pain    Pain  Current pain ratin  At best pain ratin  At worst pain ratin  Quality: dull ache, tight and discomfort  Progression: improved    Patient Goals  Patient goals for therapy: decreased pain, improved balance, increased motion, increased strength and independence with ADLs/IADLs          Objective     Neurological Testing     Sensation     Knee   Left Knee   Intact: light touch    Right Knee   Intact: light touch     Active Range of Motion   Left Knee   Flexion: 128 degrees   Extension: 0 degrees     Right Knee   Flexion: 125 degrees with pain  Extension: 0 degrees     Additional Active Range of Motion Details  Quad set    Strength/Myotome Testing Left Hip   Planes of Motion   Flexion: 4-  External rotation: 4  Internal rotation: 4+    Right Hip   Planes of Motion   Flexion: 4  External rotation: 4-  Internal rotation: 4+    Left Knee   Flexion: 5  Extension: 5  Quadriceps contraction: good    Right Knee   Flexion: 4  Extension: 4+  Quadriceps contraction: good    Left Ankle/Foot   Dorsiflexion: 5    Additional Strength Details  Improved control of quad ramps    Swelling     Left Knee Girth Measurement (cm)   Joint line: 35 5 cm  10 cm above joint line: 36 cm    Right Knee Girth Measurement (cm)   Joint line: 37 5 cm  10 cm above joint line: 38 cm    General Comments:      Lumbar Comments  Ambulation: TurboMed R sided AFO, equal stride length, no trendelenberg, minimal trunk sway  TU seconds no AD  30 sec STS: no hands momentum strategy, 10 reps    Knee Comments  Patient with scar well healed, with no remaining redness  Edema still present in knee           Diagnosis: R TKR 19   Precautions: R foot drop, chronic, AFO   Asterisks next to exercises = provided for patient HEP   Manual Therapy 9/6 9/9 9/13 9/16 9/3   Patellar mobs All directions gr IV Inferior in varying degrees of flexion Inferior in flx, all directions with leg straight  All directions grade IV   Tibiofemoral mobs ER in supine ER in hooklying ER in hooklying  ER in supinea   Scar massage        VMO facilitation                Exercise Diary   9/3   Alter G   NV     Bike 5 min for ROM 5 min for ROM 5 min for ROM 5 min for ROM 6 min for ROM seat 13   Heel slides 30x5" with strap OP      123 deg at last rep 3x10 5 sec holds with OP all reps    128 degrees  3x10 5 sec holds with OP 2x10  129 degrees  3x10 5 sec holds no OP X 30 5 sec holds with OP, 125 degrees   Lunge for knee flx        Quad ramps  0-100% x 10 with focus on 25-0% Ramping down only 100%-0% x 20     4 way SLR* 2x10 flex with quad set 2x10 with quad set, 2x10 hip abd and add on R only   2x10 with quad set Bridges    With ball 3x10  2x10 with TA   Clamshells*        Standing hip abd/ext 2x10 ea on 2" step; YTB       SLS        Gait training         Biodex        TKE* GTB 30x5" BTB 5" holds x 30 [place] 16# 5 sec holds x 30  With green TB x 30 5 sec holds   Prone quad stretch*   3x30 sec     Hamstring stretch chair        Step ups Step downs 10x 6" step  Step downs x 20 off of ^" step  Stepping down 6" x 10   Squats        Lateral step downs   2" step 2x10     Hip flx EOT    Black TB 3x10    HS curls in standing    3x10 3# ea    HS stool pulls        Marches with weight    3x10 3#                    ESTIM unit education        RE-evaluation    Performed    Modalities        Ice         ESTIM nv np   10 min during heel slides

## 2019-09-20 ENCOUNTER — OFFICE VISIT (OUTPATIENT)
Dept: PHYSICAL THERAPY | Facility: CLINIC | Age: 69
End: 2019-09-20
Payer: MEDICARE

## 2019-09-20 DIAGNOSIS — M25.561 RECURRENT PAIN OF RIGHT KNEE: ICD-10-CM

## 2019-09-20 DIAGNOSIS — M25.561 ACUTE PAIN OF RIGHT KNEE: ICD-10-CM

## 2019-09-20 DIAGNOSIS — Z96.651 TOTAL KNEE REPLACEMENT STATUS, RIGHT: Primary | ICD-10-CM

## 2019-09-20 PROCEDURE — 97112 NEUROMUSCULAR REEDUCATION: CPT | Performed by: PHYSICAL THERAPIST

## 2019-09-20 PROCEDURE — 97110 THERAPEUTIC EXERCISES: CPT | Performed by: PHYSICAL THERAPIST

## 2019-09-20 NOTE — PROGRESS NOTES
Daily Note     Today's date: 2019  Patient name: Lucita Ga  : 1950  MRN: [id number]  Referring provider: Radha Sims MD  Dx:   Encounter Diagnosis     ICD-10-CM    1  Total knee replacement status, right Z96 651    2  Recurrent pain of right knee M25 561    3  Acute pain of right knee M25 561        Start Time: 5408  Stop Time: 0745  Total time in clinic (min): 50 minutes    Subjective: Pateint reports that she was very active yesterday and had to take tylenol  She reports soreness this morning in the inside  Objective: See treatment diary below    Assessment: Tolerated treatment fair and session was focused on improvement of R knee flexion, as she continues to have difficulty with this  Patient demonstrated fatigue post treatment, exhibited good technique with therapeutic exercises and flexion improved from 120 to 125 after manuals and TERT with heat  Plan: Continue per plan of care        Diagnosis: R TKR 19   Precautions: R foot drop, chronic, AFO   Asterisks next to exercises = provided for patient HEP   Manual Therapy 9/6 9/9 9/13 9/20 9/3   Patellar mobs All directions gr IV Inferior in varying degrees of flexion Inferior in flx, all directions with leg straight Inferior All directions grade IV   Tibiofemoral mobs ER in supine ER in hooklying ER in hooklying Posterior ER in supinea   Scar massage        VMO facilitation                Exercise Diary   9/3   Alter G   NV     Bike 5 min for ROM 5 min for ROM 5 min for ROM 5 min for ROM, seat 7 6 min for ROM seat 13   Heel slides 30x5" with strap OP      123 deg at last rep 3x10 5 sec holds with OP all reps    128 degrees  3x10 5 sec holds with OP 2x10  129 degrees  3x10 5 sec holds with OP 2x10  125 degrees X 30 5 sec holds with OP, 125 degrees   Seated knee flx with heat    TERT 5 min    Lunge for knee flx        Quad ramps  0-100% x 10 with focus on 25-0% Ramping down only 100%-0% x 20     4 way SLR* 2x10 flex with quad set 2x10 with quad set, 2x10 hip abd and add on R only   2x10 with quad set   Bridges    With ball 3x10  2x10 with TA   Clamshells*        Standing hip abd/ext 2x10 ea on 2" step; YTB       SLS        Gait training         Biodex        TKE* GTB 30x5" BTB 5" holds x 30 [place] 16# 5 sec holds x 30  With green TB x 30 5 sec holds   Prone quad stretch*   3x30 sec     Hamstring stretch chair        Step ups Step downs 10x 6" step  Step downs x 20 off of ^" step  Stepping down 6" x 10   Squats        Lateral step downs   2" step 2x10 4" step 3x10    Seated calf towel stretch    3x30 sec    Piriformis stretch    3x30 sec supine                                    ESTIM unit education        RE-evaluation        Modalities        Ice         Heat    TERT knee flx    ESTIM nv np   10 min during heel slides

## 2019-09-23 ENCOUNTER — OFFICE VISIT (OUTPATIENT)
Dept: PHYSICAL THERAPY | Facility: CLINIC | Age: 69
End: 2019-09-23
Payer: MEDICARE

## 2019-09-23 DIAGNOSIS — M25.561 RECURRENT PAIN OF RIGHT KNEE: ICD-10-CM

## 2019-09-23 DIAGNOSIS — Z96.651 TOTAL KNEE REPLACEMENT STATUS, RIGHT: Primary | ICD-10-CM

## 2019-09-23 DIAGNOSIS — M25.561 ACUTE PAIN OF RIGHT KNEE: ICD-10-CM

## 2019-09-23 PROCEDURE — 97112 NEUROMUSCULAR REEDUCATION: CPT | Performed by: PHYSICAL THERAPIST

## 2019-09-23 PROCEDURE — 97110 THERAPEUTIC EXERCISES: CPT | Performed by: PHYSICAL THERAPIST

## 2019-09-23 PROCEDURE — 97140 MANUAL THERAPY 1/> REGIONS: CPT | Performed by: PHYSICAL THERAPIST

## 2019-09-23 NOTE — PROGRESS NOTES
Daily Note     Today's date: 2019  Patient name: Keith Ulloa  : 1950  MRN: [id number]  Referring provider: Wenceslao Macedo MD  Dx:   Encounter Diagnosis     ICD-10-CM    1  Total knee replacement status, right Z96 651    2  Recurrent pain of right knee M25 561    3  Acute pain of right knee M25 561        Start Time:   Stop Time: 07  Total time in clinic (min): 48 minutes    Subjective: Patient reports that she does not feel bad  On Friday she had discomfort in her fibular muscles and a cramp in her calf on Saturday that did not resolve until   Objective: See treatment diary below    Assessment: Tolerated treatment well and responded well to new stretch in kneeling to improve knee flexion, with less compensations from anterior tibialis  Patient demonstrated fatigue post treatment and exhibited good technique with therapeutic exercises, and was encouraged to add kneeling stretch to program at home  Plan: Progress treatment as tolerated         Diagnosis: R TKR 19   Precautions: R foot drop, chronic, AFO   Asterisks next to exercises = provided for patient HEP   Manual Therapy    Patellar mobs All directions gr IV Inferior in varying degrees of flexion Inferior in flx, all directions with leg straight Inferior Inferior in flx, all directions leg straight   Tibiofemoral mobs ER in supine ER in hooklying ER in hooklying Posterior Grade I/II for pain relief   Scar massage        VMO facilitation                Exercise Diary     Alter G   NV     Bike 5 min for ROM 5 min for ROM 5 min for ROM 5 min for ROM, seat 7 5 min for ROM, seat 7   Heel slides 30x5" with strap OP      123 deg at last rep 3x10 5 sec holds with OP all reps    128 degrees  3x10 5 sec holds with OP 2x10  129 degrees  3x10 5 sec holds with OP 2x10  125 degrees 3x10 5 sec holds with heat with OP x 10 reps, 130 deg with OP   Seated knee flx with heat    TERT 5 min    Lunge for knee flx        Quad ramps  0-100% x 10 with focus on 25-0% Ramping down only 100%-0% x 20     4 way SLR* 2x10 flex with quad set 2x10 with quad set, 2x10 hip abd and add on R only      Bridges    With ball 3x10     Clamshells*        Standing hip abd/ext 2x10 ea on 2" step; YTB       SLS        Gait training         Biodex        TKE* GTB 30x5" BTB 5" holds x 30 Ruth 16# 5 sec holds x 30     Prone quad stretch*   3x30 sec     Hamstring stretch chair        Step ups Step downs 10x 6" step  Step downs x 20 off of ^" step     Squats        Lateral step downs   2" step 2x10 4" step 3x10 6" 3x10   Seated calf towel stretch    3x30 sec    Piriformis stretch    3x30 sec supine    Kneeling knee flx stretch low mat     10 sec holds x 15, brace off   Leg press     90# 3x10   X-walks     Red TB x 1 lap           ESTIM unit education        RE-evaluation        Modalities        Ice         Heat    TERT knee flx    ESTIM nv np

## 2019-09-27 ENCOUNTER — OFFICE VISIT (OUTPATIENT)
Dept: PHYSICAL THERAPY | Facility: CLINIC | Age: 69
End: 2019-09-27
Payer: MEDICARE

## 2019-09-27 DIAGNOSIS — M25.561 RECURRENT PAIN OF RIGHT KNEE: ICD-10-CM

## 2019-09-27 PROCEDURE — 97140 MANUAL THERAPY 1/> REGIONS: CPT | Performed by: PHYSICAL THERAPIST

## 2019-09-27 PROCEDURE — 97110 THERAPEUTIC EXERCISES: CPT | Performed by: PHYSICAL THERAPIST

## 2019-09-27 PROCEDURE — 97112 NEUROMUSCULAR REEDUCATION: CPT | Performed by: PHYSICAL THERAPIST

## 2019-09-27 NOTE — PROGRESS NOTES
Daily Note     Today's date: 2019  Patient name: Jannette Parr  : 1950  MRN: [id number]  Referring provider: Eulalia Clifford MD  Dx:   Encounter Diagnosis     ICD-10-CM    1  Recurrent pain of right knee M25 561        Start Time: 0700  Stop Time: 07  Total time in clinic (min): 44 minutes    Subjective: Patient reports her knee is a bit stiff this morning  She went to see the surgeon, who was pleased with her progress and does not need to see her again for a year  Objective: See treatment diary below    Assessment: Tolerated treatment well and hip and knee strengthening was progressed this date with fatigue noted by patient  Patient exhibited good technique with therapeutic exercises, would benefit from continued PT and progressions as able  Plan: Continue per plan of care        Diagnosis: R TKR 19   Precautions: R foot drop, chronic, AFO   Asterisks next to exercises = provided for patient HEP   Manual Therapy    Patellar mobs All directions grade III/IV Inferior in varying degrees of flexion Inferior in flx, all directions with leg straight Inferior Inferior in flx, all directions leg straight   Tibiofemoral mobs  ER in hooklying ER in hooklying Posterior Grade I/II for pain relief   Scar massage        VMO facilitation                Exercise Diary     Alter G   NV     Bike 5 min for ROM 5 min for ROM 5 min for ROM 5 min for ROM, seat 7 5 min for ROM, seat 7   Heel slides X 5 with OP, 130 degrees 3x10 5 sec holds with OP all reps    128 degrees  3x10 5 sec holds with OP 2x10  129 degrees  3x10 5 sec holds with OP 2x10  125 degrees 3x10 5 sec holds with heat with OP x 10 reps, 130 deg with OP   Seated knee flx with heat    TERT 5 min    Lunge for knee flx        Quad ramps  0-100% x 10 with focus on 25-0% Ramping down only 100%-0% x 20     4 way SLR*  2x10 with quad set, 2x10 hip abd and add on R only      Bridges    With ball 3x10 Clamshells*        Standing hip abd/ext        SLS        Gait training         Biodex        TKE*  BTB 5" holds x 30 Ruth 16# 5 sec holds x 30     Prone quad stretch*   3x30 sec     Hamstring stretch chair        Step ups   Step downs x 20 off of ^" step     Squats        Lateral step downs   2" step 2x10 4" step 3x10 6" 3x10   Seated calf towel stretch    3x30 sec    Piriformis stretch    3x30 sec supine    Kneeling knee flx stretch low mat X 5 sec holds x 30, brace off    10 sec holds x 15, brace off   Leg press 1000# seat 4 3x10    90# 3x10   X-walks Red TB x 2 laps    Red TB x 1 lap   Clamshell plus 2x10 marvin       HS stool pulls X 2 laps marvin legs 5#                                       ESTIM unit education        RE-evaluation        Modalities        Ice         Heat    TERT knee flx    ESTIM  np

## 2019-09-30 ENCOUNTER — OFFICE VISIT (OUTPATIENT)
Dept: PHYSICAL THERAPY | Facility: CLINIC | Age: 69
End: 2019-09-30
Payer: MEDICARE

## 2019-09-30 DIAGNOSIS — M25.561 ACUTE PAIN OF RIGHT KNEE: ICD-10-CM

## 2019-09-30 DIAGNOSIS — Z96.651 TOTAL KNEE REPLACEMENT STATUS, RIGHT: Primary | ICD-10-CM

## 2019-09-30 DIAGNOSIS — M25.561 RECURRENT PAIN OF RIGHT KNEE: ICD-10-CM

## 2019-09-30 PROCEDURE — 97112 NEUROMUSCULAR REEDUCATION: CPT | Performed by: PHYSICAL THERAPIST

## 2019-09-30 PROCEDURE — 97140 MANUAL THERAPY 1/> REGIONS: CPT | Performed by: PHYSICAL THERAPIST

## 2019-09-30 PROCEDURE — 97110 THERAPEUTIC EXERCISES: CPT | Performed by: PHYSICAL THERAPIST

## 2019-09-30 NOTE — PROGRESS NOTES
Daily Note     Today's date: 2019  Patient name: Michael Gilmore  : 1950  MRN: [id number]  Referring provider: Lawrence Paz MD  Dx:   Encounter Diagnosis     ICD-10-CM    1  Total knee replacement status, right Z96 651    2  Recurrent pain of right knee M25 561    3  Acute pain of right knee M25 561        Start Time: 0655  Stop Time: 0742  Total time in clinic (min): 47 minutes    Subjective: Patient reports she had a good weekend, and she had minimal pain over the weekend  Objective: See treatment diary below    Assessment: Tolerated treatment well and was able to improve knee flx to 131 with OP after mobilizations of patella and tibia  Patient demonstrated fatigue post treatment, exhibited good technique with therapeutic exercises and was able to progress resistance for several strengthening exercises  Plan: Progress treatment as tolerated         Diagnosis: R TKR 19   Precautions: R foot drop, chronic, AFO   Asterisks next to exercises = provided for patient HEP   Manual Therapy    Patellar mobs All directions grade III/IV All directions G IV Inferior in flx, all directions with leg straight Inferior Inferior in flx, all directions leg straight   Tibiofemoral mobs  ER in hooklying with heel slides ER in hooklying Posterior Grade I/II for pain relief   Scar massage        VMO facilitation        MWM  ER tibial with heel slides      Exercise Diary     Alter G   NV     Bike 5 min for ROM 5 min for ROM 5 min for ROM 5 min for ROM, seat 7 5 min for ROM, seat 7   Heel slides X 5 with OP, 130 degrees X 10 with  degrees 3x10 5 sec holds with OP 2x10  129 degrees  3x10 5 sec holds with OP 2x10  125 degrees 3x10 5 sec holds with heat with OP x 10 reps, 130 deg with OP   Seated knee flx with heat    TERT 5 min    Lunge for knee flx        Quad ramps   Ramping down only 100%-0% x 20     4 way SLR*        Bridges    With ball 3x10     Clamshells* Standing hip abd/ext        SLS        Gait training         Biodex        TKE*   Ruth 16# 5 sec holds x 30     Prone quad stretch*  3x30 sec 3x30 sec     Hamstring stretch chair        Step ups   Step downs x 20 off of ^" step     Pulse Squats  X 10      Lunges        Lateral step downs   2" step 2x10 4" step 3x10 6" 3x10   Seated calf towel stretch    3x30 sec    Piriformis stretch    3x30 sec supine    Kneeling knee flx stretch low mat X 5 sec holds x 30, brace off X 5 sec x 30 no brace   10 sec holds x 15, brace off   Leg press 100# seat 4 3x10    90# 3x10   X-walks Red TB x 2 laps Red TB x 2 laps   Red TB x 1 lap   Clamshell plus 2x10 marvin 3x10 marvin yellow TB at knees      HS stool pulls X 2 laps marvin legs 5# X 3 laps marvin legs 10#                                      ESTIM unit education        RE-evaluation        Modalities        Ice         Heat    TERT knee flx    ESTIM

## 2019-10-04 ENCOUNTER — OFFICE VISIT (OUTPATIENT)
Dept: PHYSICAL THERAPY | Facility: CLINIC | Age: 69
End: 2019-10-04
Payer: MEDICARE

## 2019-10-04 DIAGNOSIS — Z96.651 TOTAL KNEE REPLACEMENT STATUS, RIGHT: ICD-10-CM

## 2019-10-04 DIAGNOSIS — M25.561 RECURRENT PAIN OF RIGHT KNEE: Primary | ICD-10-CM

## 2019-10-04 DIAGNOSIS — M25.561 ACUTE PAIN OF RIGHT KNEE: ICD-10-CM

## 2019-10-04 PROCEDURE — 97110 THERAPEUTIC EXERCISES: CPT

## 2019-10-04 PROCEDURE — 97112 NEUROMUSCULAR REEDUCATION: CPT

## 2019-10-07 ENCOUNTER — OFFICE VISIT (OUTPATIENT)
Dept: PHYSICAL THERAPY | Facility: CLINIC | Age: 69
End: 2019-10-07
Payer: MEDICARE

## 2019-10-07 DIAGNOSIS — M25.561 RECURRENT PAIN OF RIGHT KNEE: Primary | ICD-10-CM

## 2019-10-07 DIAGNOSIS — Z96.651 TOTAL KNEE REPLACEMENT STATUS, RIGHT: ICD-10-CM

## 2019-10-07 DIAGNOSIS — M25.561 ACUTE PAIN OF RIGHT KNEE: ICD-10-CM

## 2019-10-07 PROCEDURE — 97110 THERAPEUTIC EXERCISES: CPT

## 2019-10-07 PROCEDURE — 97112 NEUROMUSCULAR REEDUCATION: CPT

## 2019-10-07 NOTE — PROGRESS NOTES
Daily Note    Today's date: 10/7/2019  Patient name: Sj Galindo  : 1950  MRN: [id number]  Referring provider: Josi Bucio MD  Dx:   Encounter Diagnosis     ICD-10-CM    1  Recurrent pain of right knee M25 561    2  Total knee replacement status, right Z96 651    3  Acute pain of right knee M25 561        Subjective: Patient notes feeling stiff this morning  She attributes some stiffness to the weather  No complaints of pain  Objective: See treatment diary below    Assessment: Pt tolerated treatment session well  OP applied to enhance knee flexion during heel slides, good tolerance but continues to lack full flexion ROM  Decreased stiffness and fatigued noted post therapy  Pt would benefit from continued PT to improve LE strength, ROM, and mobility  Plan: Progress treatment as tolerated          Diagnosis: R TKR 19   Precautions: R foot drop, chronic, AFO   Asterisks next to exercises = provided for patient HEP   Manual Therapy 9/27 9/30 10/4 10/7 9/23   Patellar mobs All directions grade III/IV All directions G IV ROM  ROM Inferior in flx, all directions leg straight   Tibiofemoral mobs  ER in hooklying with heel slides NP  Grade I/II for pain relief   Scar massage        VMO facilitation        MWM  ER tibial with heel slides      Exercise Diary  9/27 9/30 10/4 10/7 9/23   Alter G        Bike 5 min for ROM 5 min for ROM 5 min for ROM 5 min for ROM 5 min for ROM, seat 7   Heel slides X 5 with OP, 130 degrees X 10 with  degrees X 10 with  degrees x10 with OP   3x10 5 sec holds with heat with OP x 10 reps, 130 deg with OP   Seated knee flx with heat        Lunge for knee flx        Quad ramps        4 way SLR*        Bridges         Clamshells*        Standing hip abd/ext        SLS        Gait training         Biodex        TKE*        Prone quad stretch*  3x30 sec 3x30 sec 3x30 sec    Hamstring stretch chair        Step ups        Pulse Squats  X 10 x15 x15    Lunges Lateral step downs     6" 3x10   Seated calf towel stretch        Piriformis stretch        Kneeling knee flx stretch low mat X 5 sec holds x 30, brace off X 5 sec x 30 no brace X 5 sec x 30 no brace  def 10 sec holds x 15, brace off   Leg press 100# seat 4 3x10    90# 3x10   X-walks Red TB x 2 laps Red TB x 2 laps Red TB x 2 laps RTB  2 laps Red TB x 1 lap   Clamshell plus 2x10 marvin 3x10 marvin yellow TB at knees 3x10 marvin yellow TB at knees 3x10 marvin yellow TB at knees    HS stool pulls X 2 laps marvin legs 5# X 3 laps marvin legs 10# X 3 laps marvin legs 10# X 3 laps marvin legs 10#                                    ESTIM unit education        RE-evaluation        Modalities        Ice         Heat    5 min    ESTIM

## 2019-10-11 ENCOUNTER — OFFICE VISIT (OUTPATIENT)
Dept: PHYSICAL THERAPY | Facility: CLINIC | Age: 69
End: 2019-10-11
Payer: MEDICARE

## 2019-10-11 DIAGNOSIS — Z96.651 TOTAL KNEE REPLACEMENT STATUS, RIGHT: Primary | ICD-10-CM

## 2019-10-11 DIAGNOSIS — M25.561 RECURRENT PAIN OF RIGHT KNEE: ICD-10-CM

## 2019-10-11 DIAGNOSIS — M25.561 ACUTE PAIN OF RIGHT KNEE: ICD-10-CM

## 2019-10-11 PROCEDURE — 97112 NEUROMUSCULAR REEDUCATION: CPT | Performed by: PHYSICAL THERAPIST

## 2019-10-11 PROCEDURE — 97140 MANUAL THERAPY 1/> REGIONS: CPT | Performed by: PHYSICAL THERAPIST

## 2019-10-11 PROCEDURE — 97110 THERAPEUTIC EXERCISES: CPT | Performed by: PHYSICAL THERAPIST

## 2019-10-11 NOTE — PROGRESS NOTES
Daily Note     Today's date: 10/11/2019  Patient name: Nicolina Ganser  : 1950  MRN: [id number]  Referring provider: Quoc Davis MD  Dx:   Encounter Diagnosis     ICD-10-CM    1  Total knee replacement status, right Z96 651    2  Recurrent pain of right knee M25 561    3  Acute pain of right knee M25 561        Start Time: 659  Stop Time: 07  Total time in clinic (min): 44 minutes    Subjective: Patient reports she is feeling stiff and cold this morning  Patient reports that the last few sessions were okay, but the covering therapists were not as good  Objective: See treatment diary below    Assessment: Tolerated treatment well and was challenged by progression of hip and knee strengthening  Patient demonstrated fatigue post treatment and her knee ROM is consistently between 120-130, which is functional  Education provided this date about importance of function rather than perfection in terms of knee ROM  Plan: Progress treatment as tolerated         Diagnosis: R TKR 19   Precautions: R foot drop, chronic, AFO   Asterisks next to exercises = provided for patient HEP   Manual Therapy 9/27 9/30 10/4 10/7 10/11   Patellar mobs All directions grade III/IV All directions G IV ROM  ROM All directions   Tibiofemoral mobs  ER in hooklying with heel slides NP  ER and IR grade III/IV   Scar massage        VMO facilitation        MWM  ER tibial with heel slides      Exercise Diary  9/27 9/30 10/4 10/7 10/11   Alter G        Bike 5 min for ROM 5 min for ROM 5 min for ROM 5 min for ROM 5 min for ROM   Heel slides X 5 with OP, 130 degrees X 10 with  degrees X 10 with  degrees x10 with OP   With heat TERT 5 min, flx to 125 without OP x 30 reps   Seated knee flx with heat        Lunge for knee flx        Quad ramps        4 way SLR*        Bridges         Clamshells*        Standing hip abd/ext        SLS        Gait training         Biodex        TKE*        Prone quad stretch*  3x30 sec 3x30 sec 3x30 sec    Hamstring stretch chair        Step ups        Pulse Squats  X 10 x15 x15    Pulse walks     10' x 4   Lunges        Lateral step downs     3x10 ea 6" step   Seated calf towel stretch        Piriformis stretch        Kneeling knee flx stretch low mat X 5 sec holds x 30, brace off X 5 sec x 30 no brace X 5 sec x 30 no brace  def    Leg press 100# seat 4 3x10    105# seat 4 3x10   X-walks Red TB x 2 laps Red TB x 2 laps Red TB x 2 laps RTB  2 laps    Clamshell plus 2x10 marvin 3x10 marvin yellow TB at knees 3x10 marvin yellow TB at knees 3x10 marvin yellow TB at knees    HS stool pulls X 2 laps marvin legs 5# X 3 laps marvin legs 10# X 3 laps marvin legs 10# X 3 laps marvin legs 10#    Step up holds     X 20 ea leg 8"                           ESTIM unit education        RE-evaluation        Modalities        Ice         Heat    5 min 5 min   ESTIM

## 2019-10-14 ENCOUNTER — TRANSCRIBE ORDERS (OUTPATIENT)
Dept: PHYSICAL THERAPY | Facility: CLINIC | Age: 69
End: 2019-10-14

## 2019-10-14 ENCOUNTER — EVALUATION (OUTPATIENT)
Dept: PHYSICAL THERAPY | Facility: CLINIC | Age: 69
End: 2019-10-14
Payer: MEDICARE

## 2019-10-14 DIAGNOSIS — Z96.651 TOTAL KNEE REPLACEMENT STATUS, RIGHT: Primary | ICD-10-CM

## 2019-10-14 DIAGNOSIS — M25.561 ACUTE PAIN OF RIGHT KNEE: ICD-10-CM

## 2019-10-14 DIAGNOSIS — M25.561 RECURRENT PAIN OF RIGHT KNEE: ICD-10-CM

## 2019-10-14 PROCEDURE — 97110 THERAPEUTIC EXERCISES: CPT | Performed by: PHYSICAL THERAPIST

## 2019-10-14 PROCEDURE — 97112 NEUROMUSCULAR REEDUCATION: CPT | Performed by: PHYSICAL THERAPIST

## 2019-10-14 NOTE — PROGRESS NOTES
PT Re-Evaluation     Today's date: 10/14/2019  Patient name: Yobany Belcher  : 1950  MRN: [id number]  Referring provider: Minal Armstrong MD  Dx:   Encounter Diagnosis     ICD-10-CM    1  Total knee replacement status, right Z96 651    2  Recurrent pain of right knee M25 561    3  Acute pain of right knee M25 561        Start Time: 06  Stop Time: 0743  Total time in clinic (min): 45 minutes    Assessment  Assessment details: Yobany Belcher is a pleasant 78 y o  female who presents to re-evaluation with R TKR performed on 19  Patient continues to make progress with R knee ROM and strength, but continues to have decreased hip and knee strength and decreased knee flx ROM that limit her ability to exercise, squat, perform gardening, and kneel  Patient would benefit from continued skilled PT once a week for 4 more weeks to address remaining impairments and transition to HEP and fitness program to return to PLOF  Primary movement impairment diagnosis of decreased R hip and knee strength resulting in pathoanatomical symptoms of Total knee replacement status, right  (primary encounter diagnosis)  Impairments include:  1) Decreased R knee flx ROM  2) weakness in bilateral hip abductors  3) Weakness in R hip external rotators    Etiologic factors include poor lower extremity strength, poor hip mobility and poor balance  Discussed risks, benefits, and alternatives to treatment, and answered all patient questions to patient satisfaction  Impairments: abnormal muscle firing, abnormal or restricted ROM, impaired balance, impaired physical strength and lacks appropriate home exercise program  Understanding of Dx/Px/POC: good   Prognosis: good  Prognosis details: Positive prognostic indicators include positive attitude toward recovery, good understanding of diagnosis and treatment plan options and absence of observed red flags    Negative prognostic indicators include chronicity of symptoms, minimal changes in pain with movement and multiple concurrent orthopedic problems  Goals  Impairment Goals 4-6 weeks  - Decrease pain to <3/10 - met  - Improve knee AROM to equal to the unaffected lower extremity - partially met  - Increase knee strength to 4+/5 throughout - met  - Increase hip strength to 4+/5 throughout - partially met    Functional Goals 6-8 weeks  - Return to Prior Level of Function - met  - Increase Functional Status Measure (FOTO) to: 59 - met  - Patient will be independent with HEP - met  - Patient will be able to squat without increased pain/compensation/difficulty - met  - Patient will be able to perform sit to stand without increased pain/compensation/difficulty - met  - Patient will be able to ascend and descend stairs without increased pain/compensation/difficulty - met  - Patient will be able to return to gardening without difficulty - met  - Patient will be able to be able to walk for an hour to be able to go on vacation in [facility]  - not met      Plan  Plan details: Prognosis above is given PT services 1x/week over the next month and home program adherence    Patient would benefit from: skilled physical therapy  Planned modality interventions: cryotherapy, electrical stimulation/Russian stimulation, high voltage pulsed current: pain management, high voltage pulsed current: spasm management, H-Wave, TENS, thermotherapy: hydrocollator packs and unattended electrical stimulation  Planned therapy interventions: abdominal trunk stabilization, behavior modification, body mechanics training, breathing training, flexibility, functional ROM exercises, home exercise program, joint mobilization, manual therapy, massage, Long taping, muscle pump exercises, neuromuscular re-education, patient education, postural training, strengthening, stretching, therapeutic activities, therapeutic exercise, therapeutic training, gait training, transfer training, balance and balance/weight bearing training  Frequency: 1x week  Duration in weeks: 4  Treatment plan discussed with: patient        Subjective Evaluation    History of Present Illness  Mechanism of injury: WORK/SCHOOL: Retired  and   HOME LIFE: Patient lives with her  at home and 2 dogs, 3 chickens and 8 fish  2 MONSE, bed and bath on same floor but exercise room in basement and office upstairs  HOBBIES/EXERCISE: Patient enjoys gardening, playing piano, knitting, voice lessons, and a  that comes to the home  PLOF: L knee replacement in the past  Has foot drop on the R with brace at age 27  Re-evaluation:  HISTORY OF CURRENT INJURY:  Patient reports that her R knee continues to improve  She is not having to ice every night anymore  PAIN LOCATION/DESCRIPTORS: Pain located in superiomedial aspect of the knee that occurs when she does too much exercise or when she does not rest after a long day  AGGRAVATING FACTORS: Ascending stairs, gardening, kneeling  Improved: Descending stairs, walking, getting out of a chair, and getting in and out of the car, prolonged positioning, sleeping at night, driving  EASES: Biofreeze, ice  DAY PATTERN: Worst at night, is not sleeping well  Also tired by late afternoon  IMAGING: X-ray, MRI, and CT prior to surgery  SPECIAL QUESTIONS:    Tim Li denies a new onset of Bowel/bladder dysfunction, Nausea, Clumsy or unsteadiness, Constant night pain and History of cancer  Patient has lost 10 lbs since the surgery due to loss of appetite  PATIENT GOALS: Patient wishes to improve her motion in her knee, in order to get through daily life without discomfort  - Patient reports she feels 80% improvement, and wishes to continue to improve her knee ROM and strengthen/build endurance in her leg    Pain  Current pain ratin  At best pain ratin  At worst pain ratin  Quality: dull ache, tight and discomfort  Progression: improved    Patient Goals  Patient goals for therapy: decreased pain, improved balance, increased motion, increased strength and independence with ADLs/IADLs          Objective     Neurological Testing     Sensation     Knee   Left Knee   Intact: light touch    Right Knee   Intact: light touch     Active Range of Motion   Left Knee   Flexion: 128 degrees   Extension: 0 degrees     Right Knee   Flexion: 126 degrees with pain  Extension: 0 degrees     Strength/Myotome Testing     Left Hip   Planes of Motion   Flexion: 4+  External rotation: 4+  Internal rotation: 4+    Right Hip   Planes of Motion   Flexion: 4-  External rotation: 4-  Internal rotation: 4+    Left Knee   Flexion: 5  Extension: 5  Quadriceps contraction: good    Right Knee   Flexion: 4+  Extension: 5  Quadriceps contraction: good    Left Ankle/Foot   Dorsiflexion: 5    Swelling     Left Knee Girth Measurement (cm)   Joint line: 35 5 cm  10 cm above joint line: 36 cm    Right Knee Girth Measurement (cm)   Joint line: 37 5 cm  10 cm above joint line: 38 cm    General Comments:      Lumbar Comments  Ambulation: TurboMed R sided AFO, equal stride length, no trendelenberg, minimal trunk sway  TU seconds no AD  30 sec STS: no hands momentum strategy, 10 reps    Knee Comments  Patient with scar well healed, with no remaining redness       Diagnosis: R TKR 19   Precautions: R foot drop, chronic, AFO   Asterisks next to exercises = provided for patient HEP   Manual Therapy 10/14 9/30 10/4 10/7 10/11   Patellar mobs  All directions G IV ROM  ROM All directions   Tibiofemoral mobs ER in hooklying with heel slides ER in hooklying with heel slides NP  ER and IR grade III/IV   Scar massage        VMO facilitation        MWM  ER tibial with heel slides      Exercise Diary  10/14 9/30 10/4 10/7 10/11   Alter G        Bike 5 min for ROM 5 min for ROM 5 min for ROM 5 min for ROM 5 min for ROM   Heel slides With heat TERT 5 min x 30 reps, flx no OP to 126 X 10 with  degrees X 10 with  degrees x10 with OP   With heat TERT 5 min, flx to 125 without OP x 30 reps   Seated knee flx with heat        Lunge for knee flx        Quad ramps        4 way SLR*        Bridges         Clamshells*        Standing hip abd/ext        SLS        Gait training         Biodex        TKE*        Prone quad stretch* 3x30 sec 3x30 sec 3x30 sec 3x30 sec    Hamstring stretch chair        Step ups        Pulse Squats  X 10 x15 x15    Pulse walks     10' x 4   Lunges        Lateral step downs     3x10 ea 6" step   Seated calf towel stretch        Piriformis stretch        Kneeling knee flx stretch low mat  X 5 sec x 30 no brace X 5 sec x 30 no brace  def    Leg press     105# seat 4 3x10   X-walks Green TB x 2 laps Red TB x 2 laps Red TB x 2 laps RTB  2 laps    Clamshell plus  3x10 marvin yellow TB at knees 3x10 marvin yellow TB at knees 3x10 marvin yellow TB at knees    HS stool pulls  X 3 laps marvin legs 10# X 3 laps marvin legs 10# X 3 laps marvin legs 10#    Step up holds     X 20 ea leg 8"   Hip thrusters low mat Bridge x 10 reps no weight       Wall sits 3x15 sec                                       ESTIM unit education        RE-evaluation Performed       Modalities        Ice         Heat    5 min 5 min   ESTIM          Flowsheet Rows      Most Recent Value   PT/OT G-Codes   Current Score  81   Projected Score  64   FOTO information reviewed  Yes

## 2019-10-14 NOTE — LETTER
2019    Jonathan Lu MD  [facility] [place] [address]    Patient: Wesly Spear   YOB: 1950   Date of Visit: 10/14/2019     Encounter Diagnosis     ICD-10-CM    1  Total knee replacement status, right Z96 651    2  Recurrent pain of right knee M25 561    3  Acute pain of right knee M25 561        Dear Dr Ld Evans: Thank you for your recent referral of Wesly Spear  Please review the attached evaluation summary from Aurelia's recent visit  Please verify that you agree with the plan of care by signing the attached order  If you have any questions or concerns, please do not hesitate to call  I sincerely appreciate the opportunity to share in the care of one of your patients and hope to have another opportunity to work with you in the near future  Sincerely,    Amanda Rosas, PT      Referring Provider:      I certify that I have read the below Plan of Care and certify the need for these services furnished under this plan of treatment while under my care  Jonathan Lu MD  [facility]: [phone]          PT Re-Evaluation     Today's date: 10/14/2019  Patient name: Wesly Spear  : 1950  MRN: [id number]  Referring provider: Yobany Wetzel MD  Dx:   Encounter Diagnosis     ICD-10-CM    1  Total knee replacement status, right Z96 651    2  Recurrent pain of right knee M25 561    3  Acute pain of right knee M25 561        Start Time: 0658  Stop Time: 0743  Total time in clinic (min): 45 minutes    Assessment  Assessment details: Wesly Spear is a pleasant 78 y o  female who presents to re-evaluation with R TKR performed on 19   Patient continues to make progress with R knee ROM and strength, but continues to have decreased hip and knee strength and decreased knee flx ROM that limit her ability to exercise, squat, perform gardening, and kneel  Patient would benefit from continued skilled PT once a week for 4 more weeks to address remaining impairments and transition to HEP and fitness program to return to PLOF  Primary movement impairment diagnosis of decreased R hip and knee strength resulting in pathoanatomical symptoms of Total knee replacement status, right  (primary encounter diagnosis)  Impairments include:  1) Decreased R knee flx ROM  2) weakness in bilateral hip abductors  3) Weakness in R hip external rotators    Etiologic factors include poor lower extremity strength, poor hip mobility and poor balance  Discussed risks, benefits, and alternatives to treatment, and answered all patient questions to patient satisfaction  Impairments: abnormal muscle firing, abnormal or restricted ROM, impaired balance, impaired physical strength and lacks appropriate home exercise program  Understanding of Dx/Px/POC: good   Prognosis: good  Prognosis details: Positive prognostic indicators include positive attitude toward recovery, good understanding of diagnosis and treatment plan options and absence of observed red flags  Negative prognostic indicators include chronicity of symptoms, minimal changes in pain with movement and multiple concurrent orthopedic problems      Goals  Impairment Goals 4-6 weeks  - Decrease pain to <3/10 - met  - Improve knee AROM to equal to the unaffected lower extremity - partially met  - Increase knee strength to 4+/5 throughout - met  - Increase hip strength to 4+/5 throughout - partially met    Functional Goals 6-8 weeks  - Return to Prior Level of Function - met  - Increase Functional Status Measure (FOTO) to: 59 - met  - Patient will be independent with HEP - met  - Patient will be able to squat without increased pain/compensation/difficulty - met  - Patient will be able to perform sit to stand without increased pain/compensation/difficulty - met  - Patient will be able to ascend and descend stairs without increased pain/compensation/difficulty - met  - Patient will be able to return to gardening without difficulty - met  - Patient will be able to be able to walk for an hour to be able to go on vacation in [facility]  - not met      Plan  Plan details: Prognosis above is given PT services 1x/week over the next month and home program adherence  Patient would benefit from: skilled physical therapy  Planned modality interventions: cryotherapy, electrical stimulation/Russian stimulation, high voltage pulsed current: pain management, high voltage pulsed current: spasm management, H-Wave, TENS, thermotherapy: hydrocollator packs and unattended electrical stimulation  Planned therapy interventions: abdominal trunk stabilization, behavior modification, body mechanics training, breathing training, flexibility, functional ROM exercises, home exercise program, joint mobilization, manual therapy, massage, Long taping, muscle pump exercises, neuromuscular re-education, patient education, postural training, strengthening, stretching, therapeutic activities, therapeutic exercise, therapeutic training, gait training, transfer training, balance and balance/weight bearing training  Frequency: 1x week  Duration in weeks: 4  Treatment plan discussed with: patient        Subjective Evaluation    History of Present Illness  Mechanism of injury: WORK/SCHOOL: Retired  and   HOME LIFE: Patient lives with her  at home and 2 dogs, 3 chickens and 8 fish  2 MONSE, bed and bath on same floor but exercise room in basement and office upstairs  HOBBIES/EXERCISE: Patient enjoys gardening, playing piano, knitting, voice lessons, and a  that comes to the home  PLOF: L knee replacement in the past  Has foot drop on the R with brace at age 27  Re-evaluation:  HISTORY OF CURRENT INJURY:  Patient reports that her R knee continues to improve  She is not having to ice every night anymore    PAIN LOCATION/DESCRIPTORS: Pain located in superiomedial aspect of the knee that occurs when she does too much exercise or when she does not rest after a long day  AGGRAVATING FACTORS: Ascending stairs, gardening, kneeling  Improved: Descending stairs, walking, getting out of a chair, and getting in and out of the car, prolonged positioning, sleeping at night, driving  EASES: Biofreeze, ice  DAY PATTERN: Worst at night, is not sleeping well  Also tired by late afternoon  IMAGING: X-ray, MRI, and CT prior to surgery  SPECIAL QUESTIONS:    Brianne Siegel denies a new onset of Bowel/bladder dysfunction, Nausea, Clumsy or unsteadiness, Constant night pain and History of cancer  Patient has lost 10 lbs since the surgery due to loss of appetite  PATIENT GOALS: Patient wishes to improve her motion in her knee, in order to get through daily life without discomfort  - Patient reports she feels 80% improvement, and wishes to continue to improve her knee ROM and strengthen/build endurance in her leg    Pain  Current pain ratin  At best pain ratin  At worst pain ratin  Quality: dull ache, tight and discomfort  Progression: improved    Patient Goals  Patient goals for therapy: decreased pain, improved balance, increased motion, increased strength and independence with ADLs/IADLs          Objective     Neurological Testing     Sensation     Knee   Left Knee   Intact: light touch    Right Knee   Intact: light touch     Active Range of Motion   Left Knee   Flexion: 128 degrees   Extension: 0 degrees     Right Knee   Flexion: 126 degrees with pain  Extension: 0 degrees     Strength/Myotome Testing     Left Hip   Planes of Motion   Flexion: 4+  External rotation: 4+  Internal rotation: 4+    Right Hip   Planes of Motion   Flexion: 4-  External rotation: 4-  Internal rotation: 4+    Left Knee   Flexion: 5  Extension: 5  Quadriceps contraction: good    Right Knee   Flexion: 4+  Extension: 5  Quadriceps contraction: good    Left Ankle/Foot   Dorsiflexion: 5    Swelling     Left Knee Girth Measurement (cm)   Joint line: 35 5 cm  10 cm above joint line: 36 cm    Right Knee Girth Measurement (cm)   Joint line: 37 5 cm  10 cm above joint line: 38 cm    General Comments:      Lumbar Comments  Ambulation: TurboMed R sided AFO, equal stride length, no trendelenberg, minimal trunk sway  TU seconds no AD  30 sec STS: no hands momentum strategy, 10 reps    Knee Comments  Patient with scar well healed, with no remaining redness       Diagnosis: R TKR 19   Precautions: R foot drop, chronic, AFO   Asterisks next to exercises = provided for patient HEP   Manual Therapy 10/14 9/30 10/4 10/7 10/11   Patellar mobs  All directions G IV ROM  ROM All directions   Tibiofemoral mobs ER in hooklying with heel slides ER in hooklying with heel slides NP  ER and IR grade III/IV   Scar massage        VMO facilitation        MWM  ER tibial with heel slides      Exercise Diary  10/14 9/30 10/4 10/7 10/11   Alter G        Bike 5 min for ROM 5 min for ROM 5 min for ROM 5 min for ROM 5 min for ROM   Heel slides With heat TERT 5 min x 30 reps, flx no OP to 126 X 10 with  degrees X 10 with  degrees x10 with OP   With heat TERT 5 min, flx to 125 without OP x 30 reps   Seated knee flx with heat        Lunge for knee flx        Quad ramps        4 way SLR*        Bridges         [facility]        Standing hip abd/ext        SLS        Gait training         Biodex        TKE*        Prone quad stretch* 3x30 sec 3x30 sec 3x30 sec 3x30 sec    Hamstring stretch chair        Step ups        Pulse Squats  X 10 x15 x15    Pulse walks     10' x 4   Lunges        Lateral step downs     3x10 ea 6" step   Seated calf towel stretch        Piriformis stretch        Kneeling knee flx stretch low mat  X 5 sec x 30 no brace X 5 sec x 30 no brace  def    Leg press     105# seat 4 3x10   X-walks Green TB x 2 laps Red TB x 2 laps Red TB x 2 laps RTB  2 laps    Clamshell plus 3x10 marvin yellow TB at knees 3x10 marvin yellow TB at knees 3x10 marvin yellow TB at knees    HS stool pulls  X 3 laps marvin legs 10# X 3 laps marvin legs 10# X 3 laps marvin legs 10#    Step up holds     X 20 ea leg 8"   Hip thrusters low mat Bridge x 10 reps no weight       Wall sits 3x15 sec                                       ESTIM unit education        RE-evaluation Performed       Modalities        Ice         Heat    5 min 5 min   ESTIM          Flowsheet Rows      Most Recent Value   PT/OT G-Codes   Current Score  81   Projected Score  64   FOTO information reviewed  Yes

## 2019-10-18 ENCOUNTER — OFFICE VISIT (OUTPATIENT)
Dept: PHYSICAL THERAPY | Facility: CLINIC | Age: 69
End: 2019-10-18
Payer: MEDICARE

## 2019-10-18 DIAGNOSIS — M25.561 RECURRENT PAIN OF RIGHT KNEE: ICD-10-CM

## 2019-10-18 PROCEDURE — 97110 THERAPEUTIC EXERCISES: CPT | Performed by: PHYSICAL THERAPIST

## 2019-10-18 PROCEDURE — 97112 NEUROMUSCULAR REEDUCATION: CPT | Performed by: PHYSICAL THERAPIST

## 2019-10-18 NOTE — PROGRESS NOTES
Daily Note     Today's date: 10/18/2019  Patient name: Juany Fuchs  : 1950  MRN: [id number]  Referring provider: Michael Buenrostro MD  Dx:   Encounter Diagnosis     ICD-10-CM    1  Recurrent pain of right knee M25 561        Start Time: 657  Stop Time: 745  Total time in clinic (min): 48 minutes    Subjective: Patient reports that she has had a good week  Objective: See treatment diary below    Assessment: Tolerated treatment well and session was focused on functional strenghtening and knee ROM  Pt provided meausrements of 8" step per patient's request so her  can build it at home  Patient demonstrated fatigue post treatment and exhibited good technique with therapeutic exercises  Patient continues to be challenged by step up balance exercise  Plan: Continue per plan of care        Diagnosis: R TKR 19   Precautions: R foot drop, chronic, AFO   Asterisks next to exercises = provided for patient HEP   Manual Therapy 10/18 9/30 10/4 10/7 10/11   Patellar mobs  All directions G IV ROM  ROM All directions   Tibiofemoral mobs  ER in hooklying with heel slides NP  ER and IR grade III/IV   Scar massage        VMO facilitation        MWM  ER tibial with heel slides      Exercise Diary  10/18 9/30 10/4 10/7 10/11   Alter G        Bike 5 min upright bike for ROM, seat 7 5 min for ROM 5 min for ROM 5 min for ROM 5 min for ROM   Heel slides With heat TERT 5 min, flx to 120 without OP x 30 reps X 10 with  degrees X 10 with  degrees x10 with OP   With heat TERT 5 min, flx to 125 without OP x 30 reps   Seated knee flx with heat        Lunge for knee flx        Quad ramps        4 way SLR*        Bridges         Clamshells*        Standing hip abd/ext        SLS        Gait training         Biodex        TKE*        Prone quad stretch* 3x30 sec 3x30 sec 3x30 sec 3x30 sec    Hamstring stretch chair        Step ups        Pulse Squats  X 10 x15 x15    Pulse walks     10' x 4   Lunges Lateral step downs 2x10 6" step ea side    3x10 ea 6" step   Seated calf towel stretch        Piriformis stretch        Kneeling knee flx stretch low mat  X 5 sec x 30 no brace X 5 sec x 30 no brace  def    Leg press 110# 3x10 seat 4    105# seat 4 3x10   X-walks  Red TB x 2 laps Red TB x 2 laps RTB  2 laps    Clamshell plus  3x10 marvin yellow TB at knees 3x10 marvin yellow TB at knees 3x10 marvin yellow TB at knees    HS stool pulls X 1 lap marvin, 1 lap ea leg single 10# X 3 laps marvin legs 10# X 3 laps marvin legs 10# X 3 laps marvin legs 10#    Step up holds X 20 ea leg 8" step    X 20 ea leg 8"   Squat rows        Bridge on ball with HS curl                ESTIM unit education        RE-evaluation        Modalities        Ice         Heat    5 min 5 min   ESTIM

## 2019-10-21 ENCOUNTER — OFFICE VISIT (OUTPATIENT)
Dept: PHYSICAL THERAPY | Facility: CLINIC | Age: 69
End: 2019-10-21
Payer: MEDICARE

## 2019-10-21 DIAGNOSIS — M25.561 RECURRENT PAIN OF RIGHT KNEE: ICD-10-CM

## 2019-10-21 DIAGNOSIS — Z96.651 TOTAL KNEE REPLACEMENT STATUS, RIGHT: Primary | ICD-10-CM

## 2019-10-21 DIAGNOSIS — M25.561 ACUTE PAIN OF RIGHT KNEE: ICD-10-CM

## 2019-10-21 PROCEDURE — 97110 THERAPEUTIC EXERCISES: CPT | Performed by: PHYSICAL THERAPIST

## 2019-10-21 PROCEDURE — 97112 NEUROMUSCULAR REEDUCATION: CPT | Performed by: PHYSICAL THERAPIST

## 2019-10-21 NOTE — PROGRESS NOTES
Daily Note     Today's date: 10/21/2019  Patient name: Alyssa Gallagher  : 1950  MRN: [id number]  Referring provider: Vilma Aiken MD  Dx:   Encounter Diagnosis     ICD-10-CM    1  Total knee replacement status, right Z96 651    2  Recurrent pain of right knee M25 561    3  Acute pain of right knee M25 561        Start Time: 1366  Stop Time: 0745  Total time in clinic (min): 47 minutes    Subjective: Patient reports that her knee feels good, but it "wasn't happy" last night  She did all of her exercises yesterday and didn't ice it afterwards  Objective: See treatment diary below    Assessment: Tolerated treatment well and more advanced HEP was provided for strengthening at home to address remaining weakness in hips and knees  Patient demonstrated fatigue post treatment and exhibited good technique with therapeutic exercises  Plan: Continue per plan of care        Diagnosis: R TKR 19   Precautions: R foot drop, chronic, AFO   Asterisks next to exercises = provided for patient HEP   Manual Therapy 10/18 10/21 10/4 10/7 10/11   Patellar mobs   ROM  ROM All directions   Tibiofemoral mobs   NP  ER and IR grade III/IV   Scar massage        VMO facilitation        MWM        Exercise Diary  10/18 10/21 10/4 10/7 10/11   Alter G        Bike 5 min upright bike for ROM, seat 7 5 min upright bike, seat 7 5 min for ROM 5 min for ROM 5 min for ROM   Heel slides With heat TERT 5 min, flx to 120 without OP x 30 reps With heat TERT 5 min, flx to 115 without OP x 30 reps X 10 with  degrees x10 with OP   With heat TERT 5 min, flx to 125 without OP x 30 reps   Seated knee flx with heat        Lunge for knee flx        Quad ramps        4 way SLR*        Bridges         Clamshells*        Standing hip abd/ext        SLS        Gait training         Biodex        TKE*        Prone quad stretch* 3x30 sec  3x30 sec 3x30 sec    Hamstring stretch chair        Step ups        Pulse Squats   x15 x15    Pulse walks 10' x 4   Lunges        Lateral step downs 2x10 6" step ea side    3x10 ea 6" step   Seated calf towel stretch        Piriformis stretch        Kneeling knee flx stretch low mat   X 5 sec x 30 no brace  def    Leg press 110# 3x10 seat 4    105# seat 4 3x10   X-walks  Red TB 2 laps Red TB x 2 laps RTB  2 laps    Clamshell plus   3x10 marvin yellow TB at knees 3x10 marvin yellow TB at knees    HS stool pulls X 1 lap marvin, 1 lap ea leg single 10#  X 3 laps marvin legs 10# X 3 laps marvin legs 10#    Step up holds X 20 ea leg 8" step X 20 ea leg 8" step   X 20 ea leg 8"   Squat rows  2x15 15#      Bridge on ball with HS curl  2x10 pink ball      Wall sits  3x 20-30 sec      SLS ball toss  Green ball 3x10 ea leg                              ESTIM unit education        RE-evaluation        Modalities        Ice         Heat    5 min 5 min   ESTIM

## 2019-10-25 ENCOUNTER — APPOINTMENT (OUTPATIENT)
Dept: PHYSICAL THERAPY | Facility: CLINIC | Age: 69
End: 2019-10-25
Payer: MEDICARE

## 2019-10-28 ENCOUNTER — OFFICE VISIT (OUTPATIENT)
Dept: PHYSICAL THERAPY | Facility: CLINIC | Age: 69
End: 2019-10-28
Payer: MEDICARE

## 2019-10-28 DIAGNOSIS — M25.561 RECURRENT PAIN OF RIGHT KNEE: ICD-10-CM

## 2019-10-28 PROCEDURE — 97112 NEUROMUSCULAR REEDUCATION: CPT | Performed by: PHYSICAL THERAPIST

## 2019-10-28 PROCEDURE — 97110 THERAPEUTIC EXERCISES: CPT | Performed by: PHYSICAL THERAPIST

## 2019-10-28 NOTE — PROGRESS NOTES
Daily Note     Today's date: 10/28/2019  Patient name: Vito Gray  : 1950  MRN: [id number]  Referring provider: Blanca Potter MD  Dx:   Encounter Diagnosis     ICD-10-CM    1  Recurrent pain of right knee M25 561        Start Time: 745  Stop Time: 825  Total time in clinic (min): 40 minutes    Subjective: Patient reports weekend was good, and the knee was also good this weekend  Objective: See treatment diary below    Assessment: Tolerated treatment well and continues to achieve > 120 flx on her own, but is frustrated that it is not any better  Education provided on function over measurement today  Patient demonstrated fatigue post treatment and exhibited good technique with therapeutic exercises  Plan: Continue per plan of care        Diagnosis: R TKR 19   Precautions: R foot drop, chronic, AFO   Asterisks next to exercises = provided for patient HEP   Manual Therapy 10/18 10/21 10/28 10/7 10/11   Patellar mobs   Inferior in knee flx ROM All directions   Tibiofemoral mobs     ER and IR grade III/IV   Scar massage        VMO facilitation        MWM        Exercise Diary  10/18 10/21 10/28 10/7 10/11   Alter G        Bike 5 min upright bike for ROM, seat 7 5 min upright bike, seat 7 5 min upright seat 6 5 min for ROM 5 min for ROM   Heel slides With heat TERT 5 min, flx to 120 without OP x 30 reps With heat TERT 5 min, flx to 115 without OP x 30 reps With heat TERT 5 min, flx to 122 without OP x 30 reps x10 with OP   With heat TERT 5 min, flx to 125 without OP x 30 reps   Seated knee flx with heat        Lunge for knee flx        Quad ramps        4 way SLR*        Bridges         Clamshells*        Standing hip abd/ext        SLS        Gait training         Biodex        TKE*        Prone quad stretch* 3x30 sec   3x30 sec    Hamstring stretch chair        Step ups        Pulse Squats    x15    Pulse walks     10' x 4   Lunges        Lateral step downs 2x10 6" step ea side    3x10 ea 6" step Seated calf towel stretch        Piriformis stretch        Kneeling knee flx stretch low mat    def    Leg press 110# 3x10 seat 4  115# 3x10 seat 4  105# seat 4 3x10   X-walks  Red TB 2 laps  RTB  2 laps    Clamshell plus    3x10 marvin yellow TB at knees    HS stool pulls X 1 lap marvin, 1 lap ea leg single 10#   X 3 laps marvin legs 10#    Step up holds X 20 ea leg 8" step X 20 ea leg 8" step   X 20 ea leg 8"   Squat rows  2x15 15# 3x10 18#     Bridge on ball with HS curl  2x10 pink ball 3x10 green ball     Wall sits  3x 20-30 sec 3 x 30 sec     SLS ball toss  Green ball 3x10 ea leg Green ball 3x10 ea leg                             ESTIM unit education        RE-evaluation        Modalities        Ice         Heat    5 min 5 min   ESTIM

## 2019-11-04 ENCOUNTER — OFFICE VISIT (OUTPATIENT)
Dept: PHYSICAL THERAPY | Facility: CLINIC | Age: 69
End: 2019-11-04
Payer: MEDICARE

## 2019-11-04 DIAGNOSIS — M25.561 RECURRENT PAIN OF RIGHT KNEE: ICD-10-CM

## 2019-11-04 DIAGNOSIS — Z96.651 TOTAL KNEE REPLACEMENT STATUS, RIGHT: Primary | ICD-10-CM

## 2019-11-04 DIAGNOSIS — M25.561 ACUTE PAIN OF RIGHT KNEE: ICD-10-CM

## 2019-11-04 PROCEDURE — 97112 NEUROMUSCULAR REEDUCATION: CPT | Performed by: PHYSICAL THERAPIST

## 2019-11-04 PROCEDURE — 97110 THERAPEUTIC EXERCISES: CPT | Performed by: PHYSICAL THERAPIST

## 2019-11-04 PROCEDURE — 97530 THERAPEUTIC ACTIVITIES: CPT | Performed by: PHYSICAL THERAPIST

## 2019-11-04 NOTE — PROGRESS NOTES
Daily Note     Today's date: 2019  Patient name: Rashad Moraes  : 1950  MRN: [id number]  Referring provider: Anya Alex MD  Dx:   Encounter Diagnosis     ICD-10-CM    1  Total knee replacement status, right Z96 651    2  Recurrent pain of right knee M25 561    3  Acute pain of right knee M25 561        Start Time: 98  Stop Time: 0742  Total time in clinic (min): 44 minutes    Subjective: Patient reports that she still has soreness in the medial knee, especially during the bending exercises  Objective: See treatment diary below    Assessment: Tolerated treatment well and was able to achieve 128 degrees flx today after patellar mobilizations and heel slides with heat  She was able to progress difficulty for several exercises today with fatigue noted following session  Patient exhibited good technique with therapeutic exercises and will likely discharge next visit  Plan: Potential discharge next visit       Diagnosis: R TKR 19   Precautions: R foot drop, chronic, AFO   Asterisks next to exercises = provided for patient HEP   Manual Therapy 10/18 10/21 10/28 11/4 10/11   Patellar mobs   Inferior in knee flx All directions knee ext, inferior in knee flx All directions   Tibiofemoral mobs     ER and IR grade III/IV   Scar massage        VMO facilitation        MWM        Exercise Diary  10/18 10/21 10/28 11/4 10/11   Alter G        Bike 5 min upright bike for ROM, seat 7 5 min upright bike, seat 7 5 min upright seat 6 5 min recumbent seat 5 5 min for ROM   Heel slides With heat TERT 5 min, flx to 120 without OP x 30 reps With heat TERT 5 min, flx to 115 without OP x 30 reps With heat TERT 5 min, flx to 122 without OP x 30 reps With heat TERT 5 min, flx to 128 without OP x 30 reps With heat TERT 5 min, flx to 125 without OP x 30 reps   Seated knee flx with heat        Lunge for knee flx        Quad ramps        4 way SLR*        Bridges         Clamshells*        Standing hip abd/ext        SLS Gait training         Biodex        TKE*        Prone quad stretch* 3x30 sec       Hamstring stretch chair        Step ups        Pulse Squats        Pulse walks     10' x 4   Lunges        Lateral step downs 2x10 6" step ea side    3x10 ea 6" step   Seated calf towel stretch        Piriformis stretch        Kneeling knee flx stretch low mat        Leg press 110# 3x10 seat 4  115# 3x10 seat 4 120# 3x10 seat 4 105# seat 4 3x10   X-walks  Red TB 2 laps      Clamshell plus        HS stool pulls X 1 lap marvin, 1 lap ea leg single 10#       Step up holds X 20 ea leg 8" step X 20 ea leg 8" step  X 20 L leg, x 15 R leg 12" step X 20 ea leg 8"   Squat rows  2x15 15# 3x10 18#     Bridge on ball with HS curl  2x10 pink ball 3x10 green ball 3x10 pink ball    Wall sits  3x 20-30 sec 3 x 30 sec 3 x 30 sec    SLS ball toss  Green ball 3x10 ea leg Green ball 3x10 ea leg Green ball 3x10 ea leg on foam for L, solid ground for R                            ESTIM unit education        RE-evaluation        Modalities        Ice         Heat     5 min   ESTIM

## 2019-11-11 ENCOUNTER — EVALUATION (OUTPATIENT)
Dept: PHYSICAL THERAPY | Facility: CLINIC | Age: 69
End: 2019-11-11
Payer: MEDICARE

## 2019-11-11 DIAGNOSIS — M25.561 RECURRENT PAIN OF RIGHT KNEE: ICD-10-CM

## 2019-11-11 PROCEDURE — 97110 THERAPEUTIC EXERCISES: CPT | Performed by: PHYSICAL THERAPIST

## 2019-11-11 PROCEDURE — 97112 NEUROMUSCULAR REEDUCATION: CPT | Performed by: PHYSICAL THERAPIST

## 2019-11-11 NOTE — PROGRESS NOTES
PT Discharge    Today's date: 2019  Patient name: Mik Perez  : 1950  MRN: [id number]  Referring provider: Esperanza Jessica MD  Dx:   Encounter Diagnosis     ICD-10-CM    1  Recurrent pain of right knee M25 561        Start Time: 0700  Stop Time: 07  Total time in clinic (min): 42 minutes    Assessment  Assessment details: Mik Perez has been compliant with attending physical therapy and completing home exercise program since initial evaluation  [employer]  has made improvements in objective data since initial evalulation and has achieved all goals  Patient reports having returned to their prior level of function  Patient provided with updated Home Exercise Program, all questions answered, and verbalized understanding, agreeing to plan of care  Thus it was mutually decided to discontinue this episode of care and transition to Home Exercise Program      Impairments: impaired physical strength  Understanding of Dx/Px/POC: good   Prognosis: good    Goals  Impairment Goals 4-6 weeks  - Decrease pain to <3/10 - met  - Improve knee AROM to equal to the unaffected lower extremity - partially met  - Increase knee strength to 4+/5 throughout - met  - Increase hip strength to 4+/5 throughout - met    Functional Goals 6-8 weeks  - Return to Prior Level of Function - met  - Increase Functional Status Measure (FOTO) to: 59 - met  - Patient will be independent with HEP - met  - Patient will be able to squat without increased pain/compensation/difficulty - met  - Patient will be able to perform sit to stand without increased pain/compensation/difficulty - met  - Patient will be able to ascend and descend stairs without increased pain/compensation/difficulty - met  - Patient will be able to return to gardening without difficulty - met  - Patient will be able to be able to walk for an hour to be able to go on vacation in [facility]   - met      Plan  Planned therapy interventions: home exercise program  Treatment plan discussed with: patient        Subjective Evaluation    History of Present Illness  Mechanism of injury: WORK/SCHOOL: Retired  and   HOME LIFE: Patient lives with her  at home and 2 dogs, 3 chickens and 8 fish  2 MONSE, bed and bath on same floor but exercise room in basement and office upstairs  HOBBIES/EXERCISE: Patient enjoys gardening, playing piano, knitting, voice lessons, and a  that comes to the home  PLOF: L knee replacement in the past  Has foot drop on the R with brace at age 27  Re-evaluation:  HISTORY OF CURRENT INJURY:  Patient reports that her R knee feels good  PAIN LOCATION/DESCRIPTORS: Pain located in superiomedial aspect of the knee that occurs periodically  AGGRAVATING FACTORS: kneeling  Improved: Descending stairs, walking, getting out of a chair, and getting in and out of the car, prolonged positioning, sleeping at night, driving, ascending stairs, gardening  EASES: Ice occasionally, rest  DAY PATTERN: Too much activity, not enough  She is sleeping well  IMAGING: X-ray, MRI, and CT prior to surgery  SPECIAL QUESTIONS:    Etienne Luu denies a new onset of Bowel/bladder dysfunction, Nausea, Clumsy or unsteadiness, Constant night pain and History of cancer  Patient has lost 10 lbs since the surgery due to loss of appetite  PATIENT GOALS: Patient wishes to improve her motion in her knee, in order to get through daily life without discomfort   - Patient reports she feels 95% improvement  Pain  Current pain ratin  At best pain ratin  At worst pain ratin  Quality: dull ache, tight and discomfort  Progression: improved    Patient Goals  Patient goals for therapy: decreased pain, improved balance, increased motion, increased strength and independence with ADLs/IADLs          Objective     Neurological Testing     Sensation     Knee   Left Knee   Intact: light touch    Right Knee   Intact: light touch     Active Range of Motion   Left Knee   Flexion: 128 degrees   Extension: 0 degrees     Right Knee   Flexion: 128 degrees   Extension: 0 degrees     Strength/Myotome Testing     Left Hip   Planes of Motion   Flexion: 4+  External rotation: 4+  Internal rotation: 4+    Right Hip   Planes of Motion   Flexion: 4  External rotation: 4  Internal rotation: 4+    Left Knee   Flexion: 5  Extension: 5  Quadriceps contraction: good    Right Knee   Flexion: 4+  Extension: 5  Quadriceps contraction: good    Left Ankle/Foot   Dorsiflexion: 5    Swelling     Left Knee Girth Measurement (cm)   Joint line: 35 5 cm  10 cm above joint line: 36 cm    Right Knee Girth Measurement (cm)   Joint line: 37 cm  10 cm above joint line: 38 cm    General Comments:      Lumbar Comments  Ambulation: [employer] R sided AFO, equal stride length, no trendelenberg, minimal trunk sway  TU seconds no AD  30 sec STS: no hands momentum strategy, 10 reps    Knee Comments  Patient with scar well healed    Diagnosis: R TKR 19   Precautions: R foot drop, chronic, AFO   Asterisks next to exercises = provided for patient HEP   Manual Therapy 10/18 10/21 10/28 11/4 11/11   Patellar mobs   Inferior in knee flx All directions knee ext, inferior in knee flx    Tibiofemoral mobs        Scar massage        VMO facilitation        MWM        Exercise Diary  10/18 10/21 10/28 11/4 11/11   Alter G        Bike 5 min upright bike for ROM, seat 7 5 min upright bike, seat 7 5 min upright seat 6 5 min recumbent seat 5 5 min, upright bike seat 5   Heel slides With heat TERT 5 min, flx to 120 without OP x 30 reps With heat TERT 5 min, flx to 115 without OP x 30 reps With heat TERT 5 min, flx to 122 without OP x 30 reps With heat TERT 5 min, flx to 128 without OP x 30 reps With heat TERT 5 min, flx to 130 without OP x 30 reps   Seated knee flx with heat        Lunge for knee flx        Quad ramps        4 way SLR*        Bridges         Clamshells*        Standing hip abd/ext        SLS        Gait training         Posmetricsex TKE*        Prone quad stretch* 3x30 sec    2x30 sec   Hamstring stretch chair        Step ups        Pulse Squats        Pulse walks        Lunges        Lateral step downs 2x10 6" step ea side       Seated calf towel stretch        Piriformis stretch        Kneeling knee flx stretch low mat        Leg press 110# 3x10 seat 4  115# 3x10 seat 4 120# 3x10 seat 4 120# 3x10 seat 4   X-walks  Red TB 2 laps      Clamshell plus        HS stool pulls X 1 lap marvin, 1 lap ea leg single 10#       Step up holds X 20 ea leg 8" step X 20 ea leg 8" step  X 20 L leg, x 15 R leg 12" step    Squat rows  2x15 15# 3x10 18#  18# 2x15   Bridge on ball with HS curl  2x10 pink ball 3x10 green ball 3x10 pink ball    Wall sits  3x 20-30 sec 3 x 30 sec 3 x 30 sec    SLS ball toss  Green ball 3x10 ea leg Green ball 3x10 ea leg Green ball 3x10 ea leg on foam for L, solid ground for R Green ball 3x10 ea leg on foam                            ESTIM unit education        RE-evaluation     Re-eval, discharge, FOTO   Modalities        Ice         Heat        ESTIM

## 2020-09-15 DIAGNOSIS — Z78.0 MENOPAUSE: Primary | ICD-10-CM

## 2020-09-15 NOTE — TELEPHONE ENCOUNTER
Pt needs refills for premarin, order pended  She states she takes it daily  Going to check when pt is due for annual and schedule accordingly

## 2020-12-30 DIAGNOSIS — Z78.0 MENOPAUSE: ICD-10-CM

## 2021-01-15 NOTE — TELEPHONE ENCOUNTER
Patient called back aware she is due for annual exam, she is afraid of going anywhere because of Covid, pt asking if we can send a 3 month refill on Premarin for now

## 2021-01-19 RX ORDER — CONJUGATED ESTROGENS 0.62 MG/1
TABLET, FILM COATED ORAL
Qty: 30 TABLET | Refills: 2 | Status: SHIPPED | OUTPATIENT
Start: 2021-01-19 | End: 2021-07-07 | Stop reason: SDUPTHER

## 2021-02-13 DIAGNOSIS — Z23 ENCOUNTER FOR IMMUNIZATION: ICD-10-CM

## 2021-03-06 ENCOUNTER — IMMUNIZATIONS (OUTPATIENT)
Dept: FAMILY MEDICINE CLINIC | Facility: HOSPITAL | Age: 71
End: 2021-03-06

## 2021-03-06 DIAGNOSIS — Z23 ENCOUNTER FOR IMMUNIZATION: Primary | ICD-10-CM

## 2021-03-06 PROCEDURE — 91300 SARS-COV-2 / COVID-19 MRNA VACCINE (PFIZER-BIONTECH) 30 MCG: CPT

## 2021-03-06 PROCEDURE — 0001A SARS-COV-2 / COVID-19 MRNA VACCINE (PFIZER-BIONTECH) 30 MCG: CPT

## 2021-03-25 ENCOUNTER — IMMUNIZATIONS (OUTPATIENT)
Dept: FAMILY MEDICINE CLINIC | Facility: HOSPITAL | Age: 71
End: 2021-03-25

## 2021-03-25 DIAGNOSIS — Z23 ENCOUNTER FOR IMMUNIZATION: Primary | ICD-10-CM

## 2021-03-25 PROCEDURE — 0002A SARS-COV-2 / COVID-19 MRNA VACCINE (PFIZER-BIONTECH) 30 MCG: CPT

## 2021-03-25 PROCEDURE — 91300 SARS-COV-2 / COVID-19 MRNA VACCINE (PFIZER-BIONTECH) 30 MCG: CPT

## 2021-07-07 ENCOUNTER — ANNUAL EXAM (OUTPATIENT)
Dept: OBGYN CLINIC | Facility: CLINIC | Age: 71
End: 2021-07-07
Payer: MEDICARE

## 2021-07-07 VITALS
WEIGHT: 140 LBS | SYSTOLIC BLOOD PRESSURE: 130 MMHG | HEIGHT: 69 IN | DIASTOLIC BLOOD PRESSURE: 72 MMHG | BODY MASS INDEX: 20.73 KG/M2

## 2021-07-07 DIAGNOSIS — Z12.4 SCREENING FOR MALIGNANT NEOPLASM OF CERVIX: ICD-10-CM

## 2021-07-07 DIAGNOSIS — Z12.31 ENCOUNTER FOR SCREENING MAMMOGRAM FOR MALIGNANT NEOPLASM OF BREAST: ICD-10-CM

## 2021-07-07 DIAGNOSIS — Z01.419 ENCOUNTER FOR GYNECOLOGICAL EXAMINATION WITHOUT ABNORMAL FINDING: Primary | ICD-10-CM

## 2021-07-07 DIAGNOSIS — Z78.0 MENOPAUSE: ICD-10-CM

## 2021-07-07 PROCEDURE — G0145 SCR C/V CYTO,THINLAYER,RESCR: HCPCS | Performed by: OBSTETRICS & GYNECOLOGY

## 2021-07-07 PROCEDURE — G0101 CA SCREEN;PELVIC/BREAST EXAM: HCPCS | Performed by: OBSTETRICS & GYNECOLOGY

## 2021-07-07 NOTE — PROGRESS NOTES
Assessment/Plan:         Diagnoses and all orders for this visit:    Encounter for gynecological examination without abnormal finding    Encounter for screening mammogram for malignant neoplasm of breast  -     Mammo screening bilateral w 3d & cad; Future    Screening for malignant neoplasm of cervix  -     Liquid-based pap, screening    Menopause  -     conjugated estrogens (Premarin) 0 625 mg tablet; Take 1 tablet (0 625 mg total) by mouth daily Take daily for 21 days then do not take for 7 days  Other orders  -     TURMERIC PO; Take by mouth          Subjective:      Patient ID: Tiara Greene is a 66 y o  female  Patient is a 69-year-old postmenopausal female who underwent a hysterectomy for benign indications who presents for annual exam and refill of her Premarin prescription  She feels well on the Premarin and would like to continue on with it  Options, risks, and benefits were discussed  She has no major health problems and no contraindications to the Premarin  She is due for mammogram and will schedule 1 in the near future  She is fully vaccinated against the coronavirus  She is following proper precautions during the pandemic a aura  She is originally from [facility] and still has a daughter living there  When travel restrictions are lifted she will plan on traveling there  Will see her back in 2 years or as needed  The following portions of the patient's history were reviewed and updated as appropriate: allergies, current medications, past family history, past medical history, past social history, past surgical history and problem list     Review of Systems   Constitutional: Negative for chills, diaphoresis, fatigue, fever and unexpected weight change  HENT: Negative for congestion, sinus pressure, sinus pain, tinnitus and trouble swallowing  Eyes: Negative for visual disturbance  Respiratory: Negative for cough, chest tightness and shortness of breath      Cardiovascular: Negative for chest pain, palpitations and leg swelling  Gastrointestinal: Negative for abdominal distention, abdominal pain, anal bleeding, constipation, diarrhea, nausea, rectal pain and vomiting  Endocrine: Negative for heat intolerance  Genitourinary: Negative for difficulty urinating, dysuria, flank pain, frequency, genital sores, hematuria and urgency  Musculoskeletal: Negative for arthralgias, back pain and joint swelling  Skin: Negative for rash  Allergic/Immunologic: Negative for environmental allergies and food allergies  Neurological: Negative for headaches  Hematological: Negative for adenopathy  Does not bruise/bleed easily  Psychiatric/Behavioral: Negative for decreased concentration and dysphoric mood  The patient is not nervous/anxious  Objective:      /72 (BP Location: Right arm)   Ht 5' 9" (1 753 m)   Wt 63 5 kg (140 lb)   BMI 20 67 kg/m²          Physical Exam  Vitals and nursing note reviewed  Exam conducted with a chaperone present  Constitutional:       General: She is not in acute distress  Appearance: Normal appearance  She is not ill-appearing  HENT:      Head: Normocephalic and atraumatic  Nose: Nose normal       Mouth/Throat:      Mouth: Mucous membranes are moist       Pharynx: Oropharynx is clear  Eyes:      Extraocular Movements: Extraocular movements intact  Cardiovascular:      Rate and Rhythm: Normal rate and regular rhythm  Pulses: Normal pulses  Heart sounds: Normal heart sounds  No murmur heard  No friction rub  Pulmonary:      Effort: Pulmonary effort is normal       Breath sounds: Normal breath sounds  Chest:      Breasts:         Right: Normal  No mass or tenderness  Left: Normal  No mass or tenderness  Abdominal:      General: Abdomen is flat  Bowel sounds are normal  There is no distension  Palpations: Abdomen is soft  There is no mass  Tenderness: There is no abdominal tenderness   There is no guarding  Hernia: No hernia is present  Genitourinary:     General: Normal vulva  Exam position: Lithotomy position  Pb stage (genital): 5  Labia:         Right: No rash, tenderness or lesion  Left: No rash, tenderness or lesion  Vagina: Normal       Uterus: Absent  Adnexa: Right adnexa normal and left adnexa normal         Right: No mass or tenderness  Left: No mass  Musculoskeletal:      Cervical back: Normal range of motion and neck supple  Lymphadenopathy:      Upper Body:      Right upper body: No axillary adenopathy  Left upper body: No axillary adenopathy  Skin:     General: Skin is warm and dry  Neurological:      General: No focal deficit present  Mental Status: She is alert and oriented to person, place, and time  Psychiatric:         Mood and Affect: Mood normal          Behavior: Behavior normal          Thought Content:  Thought content normal          Judgment: Judgment normal

## 2021-07-15 LAB
LAB AP GYN PRIMARY INTERPRETATION: NORMAL
Lab: NORMAL

## 2021-08-26 ENCOUNTER — TELEPHONE (OUTPATIENT)
Dept: OBGYN CLINIC | Facility: CLINIC | Age: 71
End: 2021-08-26

## 2021-08-26 NOTE — TELEPHONE ENCOUNTER
Patient called states directions for premarin needs to be changed  She takes it daily continuously  States pharmacy is requesting new script

## 2021-08-30 DIAGNOSIS — Z78.0 MENOPAUSE: ICD-10-CM

## 2021-09-28 ENCOUNTER — TELEPHONE (OUTPATIENT)
Dept: OBGYN CLINIC | Facility: CLINIC | Age: 71
End: 2021-09-28

## 2021-09-28 NOTE — TELEPHONE ENCOUNTER
Patient called states directions for premarin need to be changed  She takes it daily not 21 days then stop for 7  Pharmacy is not filling RX soon enough due to directions  Needs to say takes every day

## 2021-10-04 DIAGNOSIS — N95.1 MENOPAUSAL SYMPTOMS: Primary | ICD-10-CM

## 2021-11-03 DIAGNOSIS — Z78.0 MENOPAUSE: ICD-10-CM

## 2022-07-18 ENCOUNTER — APPOINTMENT (OUTPATIENT)
Dept: PHYSICAL THERAPY | Facility: CLINIC | Age: 72
End: 2022-07-18
Payer: MEDICARE

## 2022-07-20 ENCOUNTER — APPOINTMENT (OUTPATIENT)
Dept: PHYSICAL THERAPY | Facility: CLINIC | Age: 72
End: 2022-07-20
Payer: MEDICARE

## 2022-07-25 ENCOUNTER — APPOINTMENT (OUTPATIENT)
Dept: PHYSICAL THERAPY | Facility: CLINIC | Age: 72
End: 2022-07-25
Payer: MEDICARE

## 2022-07-27 ENCOUNTER — APPOINTMENT (OUTPATIENT)
Dept: PHYSICAL THERAPY | Facility: CLINIC | Age: 72
End: 2022-07-27
Payer: MEDICARE

## 2022-08-01 ENCOUNTER — APPOINTMENT (OUTPATIENT)
Dept: PHYSICAL THERAPY | Facility: CLINIC | Age: 72
End: 2022-08-01
Payer: MEDICARE

## 2022-08-03 ENCOUNTER — APPOINTMENT (OUTPATIENT)
Dept: PHYSICAL THERAPY | Facility: CLINIC | Age: 72
End: 2022-08-03
Payer: MEDICARE

## 2022-08-08 ENCOUNTER — APPOINTMENT (OUTPATIENT)
Dept: PHYSICAL THERAPY | Facility: CLINIC | Age: 72
End: 2022-08-08
Payer: MEDICARE

## 2022-08-10 ENCOUNTER — APPOINTMENT (OUTPATIENT)
Dept: PHYSICAL THERAPY | Facility: CLINIC | Age: 72
End: 2022-08-10
Payer: MEDICARE

## 2022-08-15 ENCOUNTER — APPOINTMENT (OUTPATIENT)
Dept: PHYSICAL THERAPY | Facility: CLINIC | Age: 72
End: 2022-08-15
Payer: MEDICARE

## 2022-08-17 ENCOUNTER — APPOINTMENT (OUTPATIENT)
Dept: PHYSICAL THERAPY | Facility: CLINIC | Age: 72
End: 2022-08-17
Payer: MEDICARE

## 2022-08-22 ENCOUNTER — OFFICE VISIT (OUTPATIENT)
Dept: PHYSICAL THERAPY | Facility: CLINIC | Age: 72
End: 2022-08-22
Payer: MEDICARE

## 2022-08-22 ENCOUNTER — APPOINTMENT (OUTPATIENT)
Dept: PHYSICAL THERAPY | Facility: CLINIC | Age: 72
End: 2022-08-22
Payer: MEDICARE

## 2022-08-22 DIAGNOSIS — R53.1 GENERALIZED WEAKNESS: Primary | ICD-10-CM

## 2022-08-22 PROCEDURE — 97161 PT EVAL LOW COMPLEX 20 MIN: CPT | Performed by: PHYSICAL THERAPIST

## 2022-08-22 NOTE — LETTER
2022    Jane Rivera MD  [address] [place], [place] [address] [id number]    Patient: Mark Graham   YOB: 1950   Date of Visit: 2022     Encounter Diagnosis     ICD-10-CM    1  Generalized weakness  R53 1        Dear Dr Bhavani Villarreal:    Mark Graham is a 67 y o  female was evaluated on 2022 under [facility] for signs and symptoms consistent with   1  Generalized weakness      Mark Graham has the above listed impairments and will benefit from skilled PT to improve deficits to return to prior level of function  Please verify that you agree with the plan of care by signing the attached order  If you have any questions or concerns, please do not hesitate to call  I sincerely appreciate the opportunity to share in the care of one of your patients and hope to have another opportunity to work with you in the near future  Sincerely,    Kathleen Nolasco PT      Referring Provider:      I certify that I have read the below Plan of Care and certify the need for these services furnished under this plan of treatment while under my care  Jane Rivera MD  [address] [place] [address] [place] [address]  Via Fax: [phone]          PT Evaluation  [facility]    Today's date: 2022  Patient name: Mark Graham  : 1950  MRN: [id number]  Referring provider: Lee Ann Smith PT  Dx:   Encounter Diagnosis     ICD-10-CM    1  Generalized weakness  R53 1        Start Time: 1445  Stop Time: 1510  Total time in clinic (min): 25 minutes    Assessment  Assessment details: Mark Graham is a pleasant 67 y o  female who presents with weakness of marvin hips and knees that impacts ability to function  The patient's greatest concerns are concern at no signs of improvement and fear of not being able to keep active  No further referral appears necessary at this time based upon examination results      Primary movement impairment diagnosis of weakness in hips resulting in pathoanatomical symptoms of Generalized weakness  (primary encounter diagnosis) and limiting her ability to perform stairs in her home, perform prolonged activity, and exercise freely  Impairments include:  1) weakness in marvin hips  2) impaired SL balance  3) decreased endurance    Discussed risks, benefits, and alternatives to treatment, and answered all patient questions to patient satisfaction  Impairments: abnormal gait, abnormal muscle firing, abnormal muscle tone, abnormal or restricted ROM, abnormal movement, activity intolerance, impaired balance, impaired physical strength, lacks appropriate home exercise program, pain with function and poor posture   Understanding of Dx/Px/POC: good   Prognosis: good    Goals  Impairment Goals 4-6 weeks  - Increase knee strength to 5/5 throughout  - Increase hip strength to 4+/5 throughout  - Improve 5x STS to within age related norms  - Improve SLS to > 15 seconds marvin    Functional Goals 6-8 weeks  - Return to Prior Level of Function  - Patient will be independent with HEP  - Patient will be able to walk long distacnes without increased pain/compensation/difficulty  - Patient will be able to perform sit to stand without increased pain/compensation/difficulty   - Patient will be able to ascend and descend stairs without increased pain/compensation/difficulty   - Patient will be able to exercise freely without compensation      Plan  Plan details: Prognosis above is given PT services 2x/week tapering to 1x/week over the next 2 months and home program adherence    Patient would benefit from: skilled physical therapy  Planned therapy interventions: abdominal trunk stabilization, behavior modification, body mechanics training, breathing training, flexibility, functional ROM exercises, home exercise program, joint mobilization, manual therapy, massage, Long taping, muscle pump exercises, neuromuscular re-education, patient education, postural training, strengthening, stretching, therapeutic activities, therapeutic exercise, therapeutic training and balance  Frequency: 2x week  Duration in weeks: 8  Treatment plan discussed with: patient        Subjective Evaluation    History of Present Illness  Mechanism of injury: WORK/SCHOOL: Retired  HOME LIFE: 1 MONSE, 7+7 steps with very high tred  HOBBIES/EXERCISE: 2x a week weights, yoga, exercise -  comes to the house 2x a week  PLOF: R knee pain tx with PT in the past; R drop foot; hx of back surgery 40 years ago  HISTORY OF CURRENT INJURY:  Patient presents to PT to determine leg strength to see if everything is alright  Patient is tired a lot of the time but has no specific functional deficits  She has not been as consistent with exercises since she moved into her new home, and does not feel as fit as she normally would be  PAIN LOCATION/DESCRIPTORS: no pain anywhere  AGGRAVATING FACTORS: stairs, prolonged activity  EASES: none  DAY PATTERN: none  IMAGING:  none  PATIENT GOALS: Patient wishes to feel stronger in daily activities and be able to exercise       Pain  No pain reported  Progression: no change    Patient Goals  Patient goals for therapy: increased strength          Objective     Strength/Myotome Testing     Left Hip   Planes of Motion   Flexion: 3+  Abduction: 4-  Adduction: 4  External rotation: 3+  Internal rotation: 4-    Right Hip   Planes of Motion   Flexion: 3+  Abduction: 3+  Adduction: 4  External rotation: 3+  Internal rotation: 4-    Left Knee   Flexion: 4  Extension: 4    Right Knee   Flexion: 4  Extension: 4    General Comments:      Lumbar Comments  5x STS: 12 75 sec  TU sec no hands  Sit to stand: valgus collapse  4 step balance test: 30/40, difficulty with SLS on R leg and tandem with R leg back             Diagnosis: Generalized weakness BLE   Precautions: R foot drop, hx of lumbar surgery 40 years ago   Primary Goals: 1) weakness in marvin hips  2) impaired SL balance  3) decreased endurance   *asterisks by exercise = given for HEP   Manuals 8/22                       There Ex        Bike vs TM                                Neuro Re-Ed        4 way SLR        Standing 3 way SLR        Clams        Bridges with hip abd        X walks        Monster walks        TRX squats        Leg press        SLS        Tandem        Gait over foam        Gait over obstacles                                 Re-evaluation              Ther Act              Lateral step ups              Step ups             Modalities

## 2022-08-22 NOTE — PROGRESS NOTES
PT Evaluation  [facility]    Today's date: 2022  Patient name: Alisia Dolan  : 1950  MRN: [id number]  Referring provider: Burtis Duverney, LION  Dx:   Encounter Diagnosis     ICD-10-CM    1  Generalized weakness  R53 1        Start Time: 1445  Stop Time: 1510  Total time in clinic (min): 25 minutes    Assessment  Assessment details: Alisia Dolan is a pleasant 67 y o  female who presents with weakness of marvin hips and knees that impacts ability to function  The patient's greatest concerns are concern at no signs of improvement and fear of not being able to keep active  No further referral appears necessary at this time based upon examination results  Primary movement impairment diagnosis of weakness in hips resulting in pathoanatomical symptoms of Generalized weakness  (primary encounter diagnosis) and limiting her ability to perform stairs in her home, perform prolonged activity, and exercise freely  Impairments include:  1) weakness in marvin hips  2) impaired SL balance  3) decreased endurance    Discussed risks, benefits, and alternatives to treatment, and answered all patient questions to patient satisfaction    Impairments: abnormal gait, abnormal muscle firing, abnormal muscle tone, abnormal or restricted ROM, abnormal movement, activity intolerance, impaired balance, impaired physical strength, lacks appropriate home exercise program, pain with function and poor posture   Understanding of Dx/Px/POC: good   Prognosis: good    Goals  Impairment Goals 4-6 weeks  - Increase knee strength to 5/5 throughout  - Increase hip strength to 4+/5 throughout  - Improve 5x STS to within age related norms  - Improve SLS to > 15 seconds marvin    Functional Goals 6-8 weeks  - Return to Prior Level of Function  - Patient will be independent with HEP  - Patient will be able to walk long distacnes without increased pain/compensation/difficulty  - Patient will be able to perform sit to stand without increased pain/compensation/difficulty   - Patient will be able to ascend and descend stairs without increased pain/compensation/difficulty   - Patient will be able to exercise freely without compensation      Plan  Plan details: Prognosis above is given PT services 2x/week tapering to 1x/week over the next 2 months and home program adherence  Patient would benefit from: skilled physical therapy  Planned therapy interventions: abdominal trunk stabilization, behavior modification, body mechanics training, breathing training, flexibility, functional ROM exercises, home exercise program, joint mobilization, manual therapy, massage, Long taping, muscle pump exercises, neuromuscular re-education, patient education, postural training, strengthening, stretching, therapeutic activities, therapeutic exercise, therapeutic training and balance  Frequency: 2x week  Duration in weeks: 8  Treatment plan discussed with: patient        Subjective Evaluation    History of Present Illness  Mechanism of injury: WORK/SCHOOL: Retired  HOME LIFE: 1 MONSE, 7+7 steps with very high tred  HOBBIES/EXERCISE: 2x a week weights, yoga, exercise -  comes to the house 2x a week  PLOF: R knee pain tx with PT in the past; R drop foot; hx of back surgery 40 years ago  HISTORY OF CURRENT INJURY:  Patient presents to PT to determine leg strength to see if everything is alright  Patient is tired a lot of the time but has no specific functional deficits  She has not been as consistent with exercises since she moved into her new home, and does not feel as fit as she normally would be  PAIN LOCATION/DESCRIPTORS: no pain anywhere  AGGRAVATING FACTORS: stairs, prolonged activity  EASES: none  DAY PATTERN: none  IMAGING:  none  PATIENT GOALS: Patient wishes to feel stronger in daily activities and be able to exercise       Pain  No pain reported  Progression: no change    Patient Goals  Patient goals for therapy: increased strength          Objective Strength/Myotome Testing     Left Hip   Planes of Motion   Flexion: 3+  Abduction: 4-  Adduction: 4  External rotation: 3+  Internal rotation: 4-    Right Hip   Planes of Motion   Flexion: 3+  Abduction: 3+  Adduction: 4  External rotation: 3+  Internal rotation: 4-    Left Knee   Flexion: 4  Extension: 4    Right Knee   Flexion: 4  Extension: 4    General Comments:      Lumbar Comments  5x STS: 12 75 sec  TU sec no hands  Sit to stand: valgus collapse  4 step balance test: 30/40, difficulty with SLS on R leg and tandem with R leg back             Diagnosis: Generalized weakness BLE   Precautions: R foot drop, hx of lumbar surgery 40 years ago   Primary Goals: 1) weakness in marvin hips  2) impaired SL balance  3) decreased endurance   *asterisks by exercise = given for HEP   Manuals                        There Ex        Bike vs TM                                Neuro Re-Ed        4 way SLR        Standing 3 way SLR        Clams        Bridges with hip abd        X walks        Monster walks        TRX squats        Leg press        SLS        Tandem        Gait over foam        Gait over obstacles                                 Re-evaluation              Ther Act              Lateral step ups              Step ups             Modalities

## 2022-08-24 ENCOUNTER — APPOINTMENT (OUTPATIENT)
Dept: PHYSICAL THERAPY | Facility: CLINIC | Age: 72
End: 2022-08-24
Payer: MEDICARE

## 2022-08-24 ENCOUNTER — OFFICE VISIT (OUTPATIENT)
Dept: PHYSICAL THERAPY | Facility: CLINIC | Age: 72
End: 2022-08-24
Payer: MEDICARE

## 2022-08-24 DIAGNOSIS — R53.1 GENERALIZED WEAKNESS: Primary | ICD-10-CM

## 2022-08-24 PROCEDURE — 97110 THERAPEUTIC EXERCISES: CPT

## 2022-08-24 PROCEDURE — 97112 NEUROMUSCULAR REEDUCATION: CPT

## 2022-08-24 NOTE — PROGRESS NOTES
Daily Note     Today's date: 2022  Patient name: Richelle Gonzalez  : 1950  MRN: [id number]  Referring provider: Martin Cardona MD  Dx:   Encounter Diagnosis     ICD-10-CM    1  Generalized weakness  R53 1        Start Time: 1220  Stop Time: 1300  Total time in clinic (min): 40 minutes    Subjective: Patient has no new complaints since initial evaluation  Objective: See treatment diary below      Assessment:Initiated outlined program  Patient was able to perform strengthening exercises as noted with moderate muscle fatigue  She exhibits good technique and is able to maintain muscle activation  Patient challenged with resisted side stepping, more prominent on the R hip  Updated HEP with good understanding  Will continue to progress as she is able to tolerate  Plan: Progress treatment as tolerated         Diagnosis: Generalized weakness BLE   Precautions: R foot drop, hx of lumbar surgery 40 years ago   Primary Goals: 1) weakness in marvin hips  2) impaired SL balance  3) decreased endurance   *asterisks by exercise = given for HEP   Manuals                       There Ex        Bike vs TM  5 min bike                               Neuro Re-Ed        4 way SLR*  2x10 ea marvin       Clams*  2x10 ea       Bridges *  2x10 ea 3 sec       X walks*  YTB 2 laps       Monster walks        TRX squats        Leg press        SLS  3x30 sec marvin       Tandem        Gait over foam        Gait over obstacles                                 Re-evaluation              Ther Act              Lateral step ups              Step ups             Modalities

## 2022-08-29 ENCOUNTER — APPOINTMENT (OUTPATIENT)
Dept: PHYSICAL THERAPY | Facility: CLINIC | Age: 72
End: 2022-08-29
Payer: MEDICARE

## 2022-08-31 ENCOUNTER — APPOINTMENT (OUTPATIENT)
Dept: PHYSICAL THERAPY | Facility: CLINIC | Age: 72
End: 2022-08-31
Payer: MEDICARE

## 2022-08-31 ENCOUNTER — OFFICE VISIT (OUTPATIENT)
Dept: PHYSICAL THERAPY | Facility: CLINIC | Age: 72
End: 2022-08-31
Payer: MEDICARE

## 2022-08-31 DIAGNOSIS — R53.1 GENERALIZED WEAKNESS: Primary | ICD-10-CM

## 2022-08-31 PROCEDURE — 97530 THERAPEUTIC ACTIVITIES: CPT

## 2022-08-31 PROCEDURE — 97112 NEUROMUSCULAR REEDUCATION: CPT

## 2022-08-31 NOTE — PROGRESS NOTES
Daily Note     Today's date: 2022  Patient name: Jennyfer Silver  : 1950  MRN: [id number]  Referring provider: Gabrielle Lopez MD  Dx:   Encounter Diagnosis     ICD-10-CM    1  Generalized weakness  R53 1        Start Time: 1145  Stop Time: 1230  Total time in clinic (min): 45 minutes    Subjective: Patient states she has been compliant with her home exercises  She has no new complaints to offer  Objective: See treatment diary below      Assessment: Continued with outlined program  Patient tolerated LP well with noted weight  She has some difficulty with monster walks due to instability and required CGA  Patient progressed with lateral step ups with cues for pacing which increased muscle fatigue  She was able to perform bridges and clamshells with resistance band added  Will continue to progress as she is able to tolerate  Plan: Progress treatment as tolerated         Diagnosis: Generalized weakness BLE   Precautions: R foot drop, hx of lumbar surgery 40 years ago   Primary Goals: 1) weakness in marvin hips  2) impaired SL balance  3) decreased endurance   *asterisks by exercise = given for HEP   Manuals                      There Ex        Bike vs TM  5 min bike  5 min upright bike                             Neuro Re-Ed        4 way SLR*  2x10 ea marvin       Clams*  2x10 ea  YTB 2x10      Bridges *  2x10 ea 3 sec  YTB 3 sec x20      X walks*  YTB 2 laps  YTB 2 laps      Monster walks- CGA    CGA YTB 2 laps      TRX squats        Leg press   70# 2x10      SLS  3x30 sec marvin  3x30 sec marvin      Tandem   2x30 sec marvin      Gait over foam        Gait over obstacles                                 Re-evaluation              Ther Act              Lateral step ups     6in 2x10 marvin         Step ups             Modalities

## 2022-09-02 ENCOUNTER — OFFICE VISIT (OUTPATIENT)
Dept: PHYSICAL THERAPY | Facility: CLINIC | Age: 72
End: 2022-09-02
Payer: MEDICARE

## 2022-09-02 DIAGNOSIS — R53.1 GENERALIZED WEAKNESS: Primary | ICD-10-CM

## 2022-09-02 PROCEDURE — 97112 NEUROMUSCULAR REEDUCATION: CPT

## 2022-09-02 PROCEDURE — 97530 THERAPEUTIC ACTIVITIES: CPT

## 2022-09-02 NOTE — PROGRESS NOTES
Daily Note     Today's date: 2022  Patient name: Pelon Glaser  : 1950  MRN: [id number]  Referring provider: Yeni Carbajal MD  Dx:   Encounter Diagnosis     ICD-10-CM    1  Generalized weakness  R53 1        Start Time: 1010  Stop Time: 1055  Total time in clinic (min): 45 minutes    Subjective: Patient states she was a little sore following last session  She states she is just tired today  She was able to do her stretches this morning before coming to PT  Objective: See treatment diary below      Assessment: Continued with outlined program  Patient was able to increase reps and resistance as noted with moderate muscle fatigue  She exhibits fair stability with Verneita Gates but is able to move slowly to avoid lateral sway and retropulsion  She does continue to require CGA for monster walks, but did move a little quicker this visit confidently  Progressed patient to fwd and lateral step overs with hurdles as she does require cues for avoid hip ER which she tends to step this way as a compensation  Patient has appropriate foot clearance when stepping over obstacles  She is making steady progress towards goals  Plan: Progress treatment as tolerated         Diagnosis: Generalized weakness BLE   Precautions: R foot drop, hx of lumbar surgery 40 years ago   Primary Goals: 1) weakness in marvin hips  2) impaired SL balance  3) decreased endurance   *asterisks by exercise = given for HEP   Manuals                     There Ex        Bike vs TM  5 min bike  5 min upright bike 5 min upright bike                             Neuro Re-Ed        4 way SLR*  2x10 ea marvin       Clams*  2x10 ea  YTB 2x10  YTB 3x10 ea    Bridges *  2x10 ea 3 sec  YTB 3 sec x20  YTB 3 sec x30     X walks*  YTB 2 laps  YTB 2 laps  YTB 2 laps     Monster walks- CGA    CGA YTB 2 laps  CGA YTB 2 laps     TRX squats        Leg press   70# 2x10  90# 25x DL  50# 25x SL     SLS  3x30 sec marvin  3x30 sec marvin  3x30 sec marvin Tandem   2x30 sec marvin      Gait over foam        Gait over obstacles    6 hurdles, fwd reciprocal 3 laps  Lateral 3 laps                              Re-evaluation              Ther Act              Lateral step ups     6in 2x10 marvin   6in 2x10 marvin       Step ups             Modalities

## 2022-09-07 ENCOUNTER — APPOINTMENT (OUTPATIENT)
Dept: PHYSICAL THERAPY | Facility: CLINIC | Age: 72
End: 2022-09-07
Payer: MEDICARE

## 2022-09-09 ENCOUNTER — APPOINTMENT (OUTPATIENT)
Dept: PHYSICAL THERAPY | Facility: CLINIC | Age: 72
End: 2022-09-09
Payer: MEDICARE

## 2022-09-12 ENCOUNTER — APPOINTMENT (OUTPATIENT)
Dept: PHYSICAL THERAPY | Facility: CLINIC | Age: 72
End: 2022-09-12
Payer: MEDICARE

## 2022-09-14 ENCOUNTER — OFFICE VISIT (OUTPATIENT)
Dept: PHYSICAL THERAPY | Facility: CLINIC | Age: 72
End: 2022-09-14
Payer: MEDICARE

## 2022-09-14 DIAGNOSIS — R53.1 GENERALIZED WEAKNESS: Primary | ICD-10-CM

## 2022-09-14 PROCEDURE — 97112 NEUROMUSCULAR REEDUCATION: CPT

## 2022-09-14 NOTE — PROGRESS NOTES
Daily Note     Today's date: 2022  Patient name: Rocio Harper  : 1950  MRN: [id number]  Referring provider: Marciano Viveros MD  Dx:   Encounter Diagnosis     ICD-10-CM    1  Generalized weakness  R53 1        Start Time: 1151  Stop Time: 1235  Total time in clinic (min): 44 minutes    Subjective: Patient states she recently had a procedure and she was instructed to take it easy until her stitches come out  Objective: See treatment diary below      Assessment: Continued with outlined program  Patient tolerated treatment well with no complaints of pain  Modified session per MD until next week  Will continue to progress as she is able to tolerate  Plan: Progress treatment as tolerated         Diagnosis: Generalized weakness BLE   Precautions: R foot drop, hx of lumbar surgery 40 years ago   Primary Goals: 1) weakness in marvin hips  2) impaired SL balance  3) decreased endurance   *asterisks by exercise = given for HEP   Manuals                    There Ex        Bike vs TM  5 min bike  5 min upright bike 5 min upright bike  5 min    ITB S     NV   Hip flexor S     NV           Neuro Re-Ed        4 way SLR*  2x10 ea marvin    3x10 ea marvin    Clams*  2x10 ea  YTB 2x10  YTB 3x10 ea RTB 3x10 ea    Bridges *  2x10 ea 3 sec  YTB 3 sec x20  YTB 3 sec x30  RTB 3x10 ea    X walks*  YTB 2 laps  YTB 2 laps  YTB 2 laps     Monster walks- CGA    CGA YTB 2 laps  CGA YTB 2 laps     TRX squats        Leg press   70# 2x10  90# 25x DL  50# 25x SL     SLS  3x30 sec marvin  3x30 sec marvin  3x30 sec marvin     Tandem   2x30 sec marvin      Gait over foam        Gait over obstacles    6 hurdles, fwd reciprocal 3 laps  Lateral 3 laps     Side stepping on foam         Hurdles                  Re-evaluation              Ther Act              Lateral step ups     6in 2x10 marvin   6in 2x10 marvin       Step ups             Modalities

## 2022-09-19 ENCOUNTER — OFFICE VISIT (OUTPATIENT)
Dept: PHYSICAL THERAPY | Facility: CLINIC | Age: 72
End: 2022-09-19
Payer: MEDICARE

## 2022-09-19 DIAGNOSIS — R53.1 GENERALIZED WEAKNESS: Primary | ICD-10-CM

## 2022-09-19 PROCEDURE — 97112 NEUROMUSCULAR REEDUCATION: CPT

## 2022-09-19 PROCEDURE — 97110 THERAPEUTIC EXERCISES: CPT

## 2022-09-19 NOTE — PROGRESS NOTES
Daily Note     Today's date: 2022  Patient name: Alisia Dolan  : 1950  MRN: [id number]  Referring provider: Beto Riggs MD  Dx:   Encounter Diagnosis     ICD-10-CM    1  Generalized weakness  R53 1        Start Time: 1120  Stop Time: 1200  Total time in clinic (min): 40 minutes    Subjective: Patient states she notices walking has been easier and she is able to propel herself forward more and does not notice something wrong  She has noticed steps have gotten easier for her as well  Objective: See treatment diary below      Assessment: Continued with outlined program  Patient was able to perform gentle stretches prior to strength this visit with good tolerance and noticeable tightness relayed bilaterally  She was able to progress with weights for marvin SLR in all planes with moderate muscle fatigue  She is continuing to make steady progress towards goals  Plan: Progress treatment as tolerated         Diagnosis: Generalized weakness BLE   Precautions: R foot drop, hx of lumbar surgery 40 years ago   Primary Goals: 1) weakness in marvin hips  2) impaired SL balance  3) decreased endurance   *asterisks by exercise = given for HEP   Manuals                    There Ex        Bike vs TM 5 min bike  5 min bike  5 min upright bike 5 min upright bike  5 min    ITB S 3x30 sec marvin     NV   Hip flexor S 3x30 sec marvin     NV           Neuro Re-Ed        4 way SLR* 1# 2x10 marvin  2x10 ea marvin    3x10 ea marvin    Clams*  2x10 ea  YTB 2x10  YTB 3x10 ea RTB 3x10 ea    Bridges *  2x10 ea 3 sec  YTB 3 sec x20  YTB 3 sec x30  RTB 3x10 ea    X walks*  YTB 2 laps  YTB 2 laps  YTB 2 laps     Monster walks- CGA    CGA YTB 2 laps  CGA YTB 2 laps     TRX squats        Leg press   70# 2x10  90# 25x DL  50# 25x SL     SLS  3x30 sec marvin  3x30 sec marvin  3x30 sec marvin     Tandem   2x30 sec marvin      Gait over foam        Gait over obstacles    6 hurdles, fwd reciprocal 3 laps  Lateral 3 laps     Side stepping on foam         Hurdles                  Re-evaluation              Ther Act              Lateral step ups     6in 2x10 marvin   6in 2x10 marvin       Step ups             Modalities

## 2022-09-21 ENCOUNTER — OFFICE VISIT (OUTPATIENT)
Dept: PHYSICAL THERAPY | Facility: CLINIC | Age: 72
End: 2022-09-21
Payer: MEDICARE

## 2022-09-21 DIAGNOSIS — R53.1 GENERALIZED WEAKNESS: Primary | ICD-10-CM

## 2022-09-21 PROCEDURE — 97110 THERAPEUTIC EXERCISES: CPT | Performed by: PHYSICAL THERAPIST

## 2022-09-21 PROCEDURE — 97112 NEUROMUSCULAR REEDUCATION: CPT | Performed by: PHYSICAL THERAPIST

## 2022-09-26 ENCOUNTER — OFFICE VISIT (OUTPATIENT)
Dept: PHYSICAL THERAPY | Facility: CLINIC | Age: 72
End: 2022-09-26
Payer: MEDICARE

## 2022-09-26 DIAGNOSIS — R53.1 GENERALIZED WEAKNESS: Primary | ICD-10-CM

## 2022-09-26 PROCEDURE — 97112 NEUROMUSCULAR REEDUCATION: CPT

## 2022-09-26 PROCEDURE — 97530 THERAPEUTIC ACTIVITIES: CPT

## 2022-09-26 NOTE — PROGRESS NOTES
Daily Note     Today's date: 2022  Patient name: Melina London  : 1950  MRN: [id number]  Referring provider: Perri Merchant MD  Dx:   Encounter Diagnosis     ICD-10-CM    1  Generalized weakness  R53 1                   Subjective: Patient reports feeling stronger overall  She states compliance with her HEP  Patient arrived late to PT and was accommodated  Objective: See treatment diary below      Assessment: Program modified to accommodate patient  Tolerated treatment well  Valgus on R with tap downs, cues provided and she exhibited better neuromuscular control  Able to increase resistance with SL and DL leg press, she was appropriately challenged with no increased pain  Patient demonstrated fatigue post treatment and would benefit from continued PT      Plan: Progress treatment as tolerated         Diagnosis: Generalized weakness BLE   Precautions: R foot drop, hx of lumbar surgery 40 years ago   Primary Goals: 1) weakness in marvin hips  2) impaired SL balance  3) decreased endurance   *asterisks by exercise = given for HEP   Manuals                    There Ex        Bike vs TM 5 min bike  TM 2 5 MPH 5 min 3 min bike warm up  5 min    ITB S 3x30 sec marvin  3x30 sec marvin HEP today  NV   Hip flexor S with rope 3x30 sec marvin  3x30 sec marvin HEP today  NV           Neuro Re-Ed        4 way SLR* 1# 2x10 marvin     3x10 ea marvin    Clams*     RTB 3x10 ea    Bridges *     RTB 3x10 ea    X walks*        Mini squats  Chair taps foam on chair 2x10 with glute squeeze Chair taps  Foam on chair 2x10  With glute squeeze     Mini lunges        Monster walks- CGA         TRX squats  X 10 chair taps Declined      Leg press  3x10 DL 90#  3x10 SL 50# 3x10 #   3x10 SL 55#     SLS        Tandem        Gait over foam        Gait over obstacles        Side stepping on foam    20x  RTB  No foam     Hurdles         SLS with ball toss  RMB x 10 ea       Re-evaluation           Ther Act           Lateral step ups          Lateral tap downs  8 in 2x10 marvin 6 in 2x10 marvin  Cues for heel tap      Step ups          Modalities

## 2022-09-28 ENCOUNTER — EVALUATION (OUTPATIENT)
Dept: PHYSICAL THERAPY | Facility: CLINIC | Age: 72
End: 2022-09-28
Payer: MEDICARE

## 2022-09-28 DIAGNOSIS — R53.1 GENERALIZED WEAKNESS: Primary | ICD-10-CM

## 2022-09-28 PROCEDURE — 97112 NEUROMUSCULAR REEDUCATION: CPT | Performed by: PHYSICAL THERAPIST

## 2022-09-28 PROCEDURE — 97110 THERAPEUTIC EXERCISES: CPT | Performed by: PHYSICAL THERAPIST

## 2022-09-28 NOTE — PROGRESS NOTES
PT Discharge     Today's date: 2022  Patient name: Vick Yepez  : 1950  MRN: [id number]  Referring provider: Tianna Cortes MD  Dx:   Encounter Diagnosis     ICD-10-CM    1  Generalized weakness  R53 1        Start Time: 1145  Stop Time: 1230  Total time in clinic (min): 45 minutes    Assessment  Assessment details: Vick Yepez has been compliant with attending physical therapy and completing home exercise program since initial evaluation  Bryson Lacy  has made improvements in objective data since initial evalulation and has achieved all goals  Patient reports having returned to their prior level of function  Patient provided with updated Home Exercise Program, all questions answered, and verbalized understanding, agreeing to plan of care   Thus it was mutually decided to discontinue this episode of care and transition to Home Exercise Program      Impairments: impaired balance and impaired physical strength  Understanding of Dx/Px/POC: good   Prognosis: good    Goals  Impairment Goals 4-6 weeks  - Increase knee strength to 5/5 throughout - met  - Increase hip strength to 4+/5 throughout - partially met  - Improve 5x STS to within age related norms - partially met  - Improve SLS to > 15 seconds marvin - partially met    Functional Goals 6-8 weeks  - Return to Prior Level of Function - met  - Patient will be independent with HEP - met  - Patient will be able to walk long distacnes without increased pain/compensation/difficulty - met  - Patient will be able to perform sit to stand without increased pain/compensation/difficulty - met  - Patient will be able to ascend and descend stairs without increased pain/compensation/difficulty - met  - Patient will be able to exercise freely without compensation - met      Plan  Planned therapy interventions: home exercise program  Treatment plan discussed with: patient        Subjective Evaluation    History of Present Illness  Mechanism of injury: WORK/SCHOOL: Retired  HOME LIFE: 1 MONSE, 7+7 steps with very high tred  HOBBIES/EXERCISE: 2x a week weights, yoga, exercise -  comes to the house 2x a week  PLOF: R knee pain tx with PT in the past; R drop foot; hx of back surgery 40 years ago  HISTORY OF CURRENT INJURY:  Patient presents to PT to determine leg strength to see if everything is alright  Patient is tired a lot of the time but has no specific functional deficits  She has not been as consistent with exercises since she moved into her new home, and does not feel as fit as she normally would be  Update: Patient reports that she is feeling pretty good  She is feeling stronger especially on the stairs  She feels 90% improved at this time  PAIN LOCATION/DESCRIPTORS: no pain anywhere  AGGRAVATING FACTORS: prolonged activity  Improved: stairs, prolonged activity  EASES: none  DAY PATTERN: none  IMAGING:  none  PATIENT GOALS: Patient wishes to feel stronger in daily activities and be able to exercise   - 90% improved    Pain  No pain reported  Progression: improved    Patient Goals  Patient goals for therapy: increased strength          Objective     Strength/Myotome Testing     Left Hip   Planes of Motion   Flexion: 4  Abduction: 4  Adduction: 4  External rotation: 4-  Internal rotation: 4    Right Hip   Planes of Motion   Flexion: 4-  Abduction: 4-  Adduction: 4  External rotation: 4-  Internal rotation: 4    Left Knee   Flexion: 4+  Extension: 4+    Right Knee   Flexion: 4+  Extension: 4+    General Comments:      Lumbar Comments  5x STS: 11 95 sec  TU sec no hands  Sit to stand: valgus collapse  4 step balance test: 40/40, SLS 10 seconds              Diagnosis: Generalized weakness BLE   Precautions: R foot drop, hx of lumbar surgery 40 years ago   Primary Goals: 1) weakness in marvin hips  2) impaired SL balance  3) decreased endurance   *asterisks by exercise = given for HEP   Manuals                    There Ex        Bike vs TM 5 min bike  TM 2 5 MPH 5 min 3 min bike warm up 5 min bike warm up 5 min    ITB S 3x30 sec marvin  3x30 sec marvin HEP today  NV   Hip flexor S with rope 3x30 sec marvin  3x30 sec marvin HEP today  NV           Neuro Re-Ed        4 way SLR* 1# 2x10 marvin     3x10 ea marvin    Clams*     RTB 3x10 ea    Bridges *    [place] bridges x 10 RTB 3x10 ea    X walks*        Mini squats  Chair taps foam on chair 2x10 with glute squeeze Chair taps  Foam on chair 2x10  With glute squeeze     Mini lunges        Monster walks- CGA         TRX squats  X 10 chair taps Declined      Leg press  3x10 DL 90#  3x10 SL 50# 3x10 #   3x10 SL 55#     SLS        Tandem        Gait over foam        Gait over obstacles        Side stepping on foam    20x  RTB  No foam     Hurdles     HEP    SLS with ball toss  RMB x 10 ea       Re-evaluation     IM, PT      Ther Act           Lateral step ups          Lateral tap downs  8 in 2x10 marvin 6 in 2x10 marvin  Cues for heel tap      Step ups          Modalities

## 2022-10-07 DIAGNOSIS — Z78.0 MENOPAUSE: ICD-10-CM

## 2022-10-11 RX ORDER — CONJUGATED ESTROGENS 0.62 MG/1
TABLET, FILM COATED ORAL
Qty: 90 TABLET | Refills: 3 | Status: SHIPPED | OUTPATIENT
Start: 2022-10-11

## 2023-02-07 ENCOUNTER — ANNUAL EXAM (OUTPATIENT)
Dept: OBGYN CLINIC | Facility: CLINIC | Age: 73
End: 2023-02-07

## 2023-02-07 VITALS
SYSTOLIC BLOOD PRESSURE: 132 MMHG | BODY MASS INDEX: 21.03 KG/M2 | WEIGHT: 142 LBS | HEIGHT: 69 IN | DIASTOLIC BLOOD PRESSURE: 70 MMHG

## 2023-02-07 DIAGNOSIS — N95.1 MENOPAUSAL SYMPTOMS: ICD-10-CM

## 2023-02-07 NOTE — PROGRESS NOTES
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  Asthma stable  Plan  Continue Premarin risk-benefit side effects reviewed and discussed with patient  Continue calcium and vitamin D  Mammogram as scheduled  Up-to-date with colonoscopy  DEXA scan  Return to office for annual exam  Subjective:      Patient ID: Yamilex Cedeño is a 67 y o  female      HPI  68 yo female present to the office today follow-up on primary and  Had hysterectomy sec to endometriosis on the sciatic nerve had TAHBSO at age 28 yo    Patient was taking medication secondary to hot flashes and night sweats that was affecting her quality of life  Patient desired to continue on Premarin  Past medical history and surgical history reviewed and discussed with patient  Risk-benefit side effect of Premarin explained and discussed with patient in details  Patient aware of all the risk of the medication and desires to continue again risk-benefit side effect explained and discussed with patient risk of stroke/DVT/thrombosis reviewed and discussed with patient in details patient also informed if she has any new medical history or need to be on any medication increased risk for thrombosis need to notify us immediately then we have to stop the Premarin  All patient question answered and patient was satisfied        The following portions of the patient's history were reviewed and updated as appropriate: allergies, current medications, past family history, past medical history, past social history, past surgical history and problem list     Review of Systems      Objective:      /70 (BP Location: Left arm, Patient Position: Sitting, Cuff Size: Adult)   Ht 5' 9" (1 753 m)   Wt 64 4 kg (142 lb)   BMI 20 97 kg/m²          Physical Exam  Constitutional:       Appearance: Normal appearance  Neurological:      General: No focal deficit present  Mental Status: She is alert and oriented to person, place, and time     Psychiatric:         Mood and Affect: Mood normal          Behavior: Behavior normal

## 2023-03-01 ENCOUNTER — HOSPITAL ENCOUNTER (OUTPATIENT)
Dept: CT IMAGING | Facility: HOSPITAL | Age: 73
Discharge: HOME | End: 2023-03-01

## 2023-03-01 DIAGNOSIS — J47.9 BRONCHIECTASIS WITHOUT COMPLICATION (HCC): ICD-10-CM

## 2023-03-14 ENCOUNTER — HOSPITAL ENCOUNTER (OUTPATIENT)
Dept: VASCULAR ULTRASOUND | Facility: HOSPITAL | Age: 73
Discharge: HOME | End: 2023-03-14

## 2023-03-14 ENCOUNTER — HOSPITAL ENCOUNTER (OUTPATIENT)
Dept: RADIOLOGY | Facility: HOSPITAL | Age: 73
Discharge: HOME | End: 2023-03-14

## 2023-03-14 ENCOUNTER — APPOINTMENT (OUTPATIENT)
Dept: LAB | Facility: HOSPITAL | Age: 73
End: 2023-03-14
Attending: INTERNAL MEDICINE

## 2023-03-14 DIAGNOSIS — I87.2 PERIPHERAL VENOUS INSUFFICIENCY: ICD-10-CM

## 2023-03-14 DIAGNOSIS — I87.2 VENOUS INSUFFICIENCY (CHRONIC) (PERIPHERAL): ICD-10-CM

## 2023-03-14 DIAGNOSIS — M79.89 SWELLING OF RIGHT LOWER EXTREMITY: ICD-10-CM

## 2023-03-14 DIAGNOSIS — L03.115 CELLULITIS OF RIGHT FOOT: ICD-10-CM

## 2023-03-14 DIAGNOSIS — I10 ESSENTIAL HYPERTENSION, MALIGNANT: ICD-10-CM

## 2023-03-14 LAB
ALBUMIN SERPL BCP-MCNC: 4.7 G/DL (ref 3.5–5)
ALP SERPL-CCNC: 75 U/L (ref 34–104)
ALT SERPL W P-5'-P-CCNC: 14 U/L (ref 7–52)
ANION GAP SERPL CALCULATED.3IONS-SCNC: 8 MMOL/L (ref 4–13)
APTT PPP: 23 SECONDS (ref 23–37)
AST SERPL W P-5'-P-CCNC: 17 U/L (ref 13–39)
BASOPHILS # BLD AUTO: 0.07 THOUSANDS/ÂΜL (ref 0–0.1)
BASOPHILS NFR BLD AUTO: 1 % (ref 0–1)
BILIRUB SERPL-MCNC: 0.35 MG/DL (ref 0.2–1)
BUN SERPL-MCNC: 12 MG/DL (ref 5–25)
CALCIUM SERPL-MCNC: 9.3 MG/DL (ref 8.4–10.2)
CHLORIDE SERPL-SCNC: 99 MMOL/L (ref 96–108)
CHOLEST SERPL-MCNC: 266 MG/DL
CO2 SERPL-SCNC: 28 MMOL/L (ref 21–32)
CREAT SERPL-MCNC: 0.54 MG/DL (ref 0.6–1.3)
CRP SERPL QL: 3 MG/L
EOSINOPHIL # BLD AUTO: 0.14 THOUSAND/ÂΜL (ref 0–0.61)
EOSINOPHIL NFR BLD AUTO: 2 % (ref 0–6)
ERYTHROCYTE [DISTWIDTH] IN BLOOD BY AUTOMATED COUNT: 13.1 % (ref 11.6–15.1)
ERYTHROCYTE [SEDIMENTATION RATE] IN BLOOD: 12 MM/HOUR (ref 0–30)
GFR SERPL CREATININE-BSD FRML MDRD: 93 ML/MIN/1.73SQ M
GLUCOSE P FAST SERPL-MCNC: 106 MG/DL (ref 65–99)
HCT VFR BLD AUTO: 42 % (ref 34.8–46.1)
HDLC SERPL-MCNC: 66 MG/DL
HGB BLD-MCNC: 14.2 G/DL (ref 11.5–15.4)
IMM GRANULOCYTES # BLD AUTO: 0.04 THOUSAND/UL (ref 0–0.2)
IMM GRANULOCYTES NFR BLD AUTO: 0 % (ref 0–2)
INR PPP: 0.93 (ref 0.84–1.19)
LDLC SERPL CALC-MCNC: 176 MG/DL (ref 0–100)
LYMPHOCYTES # BLD AUTO: 2.23 THOUSANDS/ÂΜL (ref 0.6–4.47)
LYMPHOCYTES NFR BLD AUTO: 23 % (ref 14–44)
MCH RBC QN AUTO: 28.9 PG (ref 26.8–34.3)
MCHC RBC AUTO-ENTMCNC: 33.8 G/DL (ref 31.4–37.4)
MCV RBC AUTO: 86 FL (ref 82–98)
MONOCYTES # BLD AUTO: 0.58 THOUSAND/ÂΜL (ref 0.17–1.22)
MONOCYTES NFR BLD AUTO: 6 % (ref 4–12)
NEUTROPHILS # BLD AUTO: 6.46 THOUSANDS/ÂΜL (ref 1.85–7.62)
NEUTS SEG NFR BLD AUTO: 68 % (ref 43–75)
NONHDLC SERPL-MCNC: 200 MG/DL
NRBC BLD AUTO-RTO: 0 /100 WBCS
PLATELET # BLD AUTO: 348 THOUSANDS/UL (ref 149–390)
PMV BLD AUTO: 8.2 FL (ref 8.9–12.7)
POTASSIUM SERPL-SCNC: 4 MMOL/L (ref 3.5–5.3)
PROT SERPL-MCNC: 7.6 G/DL (ref 6.4–8.4)
PROTHROMBIN TIME: 12.8 SECONDS (ref 11.6–14.5)
RBC # BLD AUTO: 4.91 MILLION/UL (ref 3.81–5.12)
SODIUM SERPL-SCNC: 135 MMOL/L (ref 135–147)
TRIGL SERPL-MCNC: 121 MG/DL
TSH SERPL DL<=0.05 MIU/L-ACNC: 4.13 UIU/ML (ref 0.45–4.5)
WBC # BLD AUTO: 9.52 THOUSAND/UL (ref 4.31–10.16)

## 2023-03-22 ENCOUNTER — OFFICE VISIT (OUTPATIENT)
Dept: WOUND CARE | Facility: HOSPITAL | Age: 73
End: 2023-03-22

## 2023-03-22 VITALS
HEART RATE: 72 BPM | TEMPERATURE: 98 F | SYSTOLIC BLOOD PRESSURE: 124 MMHG | DIASTOLIC BLOOD PRESSURE: 72 MMHG | BODY MASS INDEX: 20.73 KG/M2 | WEIGHT: 140 LBS | RESPIRATION RATE: 16 BRPM | HEIGHT: 69 IN

## 2023-03-22 DIAGNOSIS — M21.371 FOOT DROP, RIGHT FOOT: ICD-10-CM

## 2023-03-22 DIAGNOSIS — L89.893 PRESSURE INJURY OF DORSUM OF RIGHT FOOT, STAGE 3 (HCC): Primary | ICD-10-CM

## 2023-03-22 RX ORDER — LIDOCAINE HYDROCHLORIDE 40 MG/ML
5 SOLUTION TOPICAL ONCE
Status: COMPLETED | OUTPATIENT
Start: 2023-03-22 | End: 2023-03-22

## 2023-03-22 RX ADMIN — LIDOCAINE HYDROCHLORIDE 5 ML: 40 SOLUTION TOPICAL at 10:32

## 2023-03-22 NOTE — PATIENT INSTRUCTIONS
Orders Placed This Encounter   Procedures    Wound cleansing and dressings     Wash your hands with soap and water  Remove old dressing, discard into plastic bag and place in trash  Cleanse the wound with soap and water prior to applying a clean dressing  Do not use tissue or cotton balls  Do not scrub the wound  Pat dry using gauze  Shower Yes    Apply a felt pad to skin surrounding wound or attach to the shoe  Apply mupirocin and Dermagran to the wound  Cover with gauze and secure with tape  Change dressing every other day  Follow up in 2 weeks       Standing Status:   Future     Standing Expiration Date:   3/22/2024

## 2023-03-22 NOTE — PROGRESS NOTES
Patient ID: Jacobo Busby is a 67 y o  female Date of Birth 1950     Chief Complaint  Chief Complaint   Patient presents with   • New Patient Visit     Right foot wound       Allergies  Iodine - food allergy, Tetanus toxoids, and Vancomycin    Assessment:    No problem-specific Assessment & Plan notes found for this encounter  {Assess/PlanSmartLinks:06645}          Procedures    Plan:     Wound 03/22/23 Pressure Injury Foot Anterior;Right;Dorsal (Active)   Wound Image Images linked 03/22/23 1017   Wound Description Beefy red;Yellow 03/22/23 1017   Pressure Injury Stage 3 03/22/23 1017   Dorie-wound Assessment Callus 03/22/23 1017   Wound Length (cm) 0 6 cm 03/22/23 1017   Wound Width (cm) 0 4 cm 03/22/23 1017   Wound Depth (cm) 0 1 cm 03/22/23 1017   Wound Surface Area (cm^2) 0 24 cm^2 03/22/23 1017   Wound Volume (cm^3) 0 024 cm^3 03/22/23 1017   Calculated Wound Volume (cm^3) 0 02 cm^3 03/22/23 1017   Drainage Amount Scant 03/22/23 1017   Non-staged Wound Description Full thickness 03/22/23 1017   Dressing Status Intact 03/22/23 1017       Wound 03/22/23 Pressure Injury Foot Anterior;Right;Dorsal (Active)   Date First Assessed/Time First Assessed: 03/22/23 1015   Pre-Existing Wound: Yes  Primary Wound Type: Pressure Injury  Location: Foot  Wound Location Orientation: Anterior;Right;Dorsal       Subjective:           65-year-old white female presents concerned with chronic wound right foot which developed secondary to dropfoot brace irritating the top of her foot causing a wound  Has been soaking and trying her own home remedies but it still continues to drain  Recent diagnosis of DVT and initiated on Eliquis 2 weeks ago        {Common ambulatory SmartLinks:61510}    Review of Systems      Objective:       Wound 03/22/23 Pressure Injury Foot Anterior;Right;Dorsal (Active)   Wound Image Images linked 03/22/23 1017   Wound Description Beefy red;Yellow 03/22/23 1017   Pressure Injury Stage 3 03/22/23 1017 Dorie-wound Assessment Callus 03/22/23 1017   Wound Length (cm) 0 6 cm 03/22/23 1017   Wound Width (cm) 0 4 cm 03/22/23 1017   Wound Depth (cm) 0 1 cm 03/22/23 1017   Wound Surface Area (cm^2) 0 24 cm^2 03/22/23 1017   Wound Volume (cm^3) 0 024 cm^3 03/22/23 1017   Calculated Wound Volume (cm^3) 0 02 cm^3 03/22/23 1017   Drainage Amount Scant 03/22/23 1017   Non-staged Wound Description Full thickness 03/22/23 1017   Dressing Status Intact 03/22/23 1017       /72   Pulse 72   Temp 98 °F (36 7 °C)   Resp 16   Ht 5' 9" (1 753 m)   Wt 63 5 kg (140 lb)   BMI 20 67 kg/m²     Physical Exam      Wound Instructions:  Orders Placed This Encounter   Procedures   • Wound cleansing and dressings     Wash your hands with soap and water  Remove old dressing, discard into plastic bag and place in trash  Cleanse the wound with soap and water prior to applying a clean dressing  Do not use tissue or cotton balls  Do not scrub the wound  Pat dry using gauze  Shower Yes    Apply a felt pad to skin surrounding wound or attach to the shoe  Apply mupirocin and Dermagran to the wound  Cover with gauze and secure with tape  Change dressing every other day  Follow up in 2 weeks  Standing Status:   Future     Standing Expiration Date:   3/22/2024        Diagnosis ICD-10-CM Associated Orders   1  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, stage 3 ([facility])  L89 893 lidocaine (XYLOCAINE) 4 % topical solution 5 mL     Wound cleansing and dressings     mupirocin (BACTROBAN) 2 % ointment      2   Foot drop, right foot  M21 371 "  Gastrointestinal: Negative for abdominal pain and vomiting  Genitourinary: Negative for dysuria and hematuria  Musculoskeletal: Positive for gait problem  Negative for arthralgias and back pain  Weakness right foot   Skin: Positive for wound (Wound right dorsal medial midfoot)  Negative for color change and rash  Neurological: Negative for seizures and syncope  All other systems reviewed and are negative  Objective:       Wound 03/22/23 Pressure Injury Foot Anterior;Right;Dorsal (Active)   Wound Image Images linked 03/22/23 1017   Wound Description Beefy red;Yellow 03/22/23 1017   Pressure Injury Stage 3 03/22/23 1017   Dorie-wound Assessment Callus 03/22/23 1017   Wound Length (cm) 0 6 cm 03/22/23 1017   Wound Width (cm) 0 4 cm 03/22/23 1017   Wound Depth (cm) 0 1 cm 03/22/23 1017   Wound Surface Area (cm^2) 0 24 cm^2 03/22/23 1017   Wound Volume (cm^3) 0 024 cm^3 03/22/23 1017   Calculated Wound Volume (cm^3) 0 02 cm^3 03/22/23 1017   Drainage Amount Scant 03/22/23 1017   Non-staged Wound Description Full thickness 03/22/23 1017   Dressing Status Intact 03/22/23 1017             /72   Pulse 72   Temp 98 °F (36 7 °C)   Resp 16   Ht 5' 9\" (1 753 m)   Wt 63 5 kg (140 lb)   BMI 20 67 kg/m²     Physical Exam  Constitutional:       Appearance: Normal appearance  HENT:      Head: Normocephalic  Right Ear: External ear normal       Left Ear: External ear normal    Eyes:      Pupils: Pupils are equal, round, and reactive to light  Cardiovascular:      Rate and Rhythm: Normal rate  Pulses:           Dorsalis pedis pulses are 1+ on the right side and 1+ on the left side  Posterior tibial pulses are 0 on the right side and 0 on the left side  Pulmonary:      Effort: Pulmonary effort is normal    Musculoskeletal:      Cervical back: Normal range of motion  Right foot: Foot drop present        Comments: Foot deformity right with loss of anterior muscle group strength " 1 out 5   Feet:      Right foot:      Protective Sensation: 10 sites tested  10 sites sensed  Skin integrity: Ulcer (Partial depth breakdown dorsal medial aspect of right medial midfoot  Superficial eschar, no infection) present  Left foot:      Protective Sensation: 10 sites tested  10 sites sensed  Skin integrity: Skin integrity normal       Comments: Wears external dropfoot brace on the right side which attaches to distal laces  Skin:     General: Skin is warm and dry  Capillary Refill: Capillary refill takes 2 to 3 seconds  Comments: Texture tone and turgor are diminished with moderate atrophic changes present, no digital hair  Neurological:      General: No focal deficit present  Mental Status: She is alert  Motor: Weakness present  Gait: Gait abnormal    Psychiatric:         Mood and Affect: Mood normal            Wound Instructions:  Orders Placed This Encounter   Procedures   • Wound cleansing and dressings     Wash your hands with soap and water  Remove old dressing, discard into plastic bag and place in trash  Cleanse the wound with soap and water prior to applying a clean dressing  Do not use tissue or cotton balls  Do not scrub the wound  Pat dry using gauze  Shower Yes    Apply a felt pad to skin surrounding wound or attach to the shoe  Apply mupirocin and Dermagran to the wound  Cover with gauze and secure with tape  Change dressing every other day  Follow up in 2 weeks  Standing Status:   Future     Standing Expiration Date:   3/22/2024   • Debridement     This order was created via procedure documentation        Diagnosis ICD-10-CM Associated Orders   1  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

## 2023-04-05 ENCOUNTER — OFFICE VISIT (OUTPATIENT)
Dept: WOUND CARE | Facility: HOSPITAL | Age: 73
End: 2023-04-05

## 2023-04-05 ENCOUNTER — HOSPITAL ENCOUNTER (OUTPATIENT)
Dept: BONE DENSITY | Facility: IMAGING CENTER | Age: 73
Discharge: HOME | End: 2023-04-05

## 2023-04-05 VITALS — HEIGHT: 67 IN | BODY MASS INDEX: 22.44 KG/M2 | WEIGHT: 143 LBS

## 2023-04-05 VITALS
HEART RATE: 80 BPM | RESPIRATION RATE: 16 BRPM | SYSTOLIC BLOOD PRESSURE: 132 MMHG | TEMPERATURE: 98.6 F | DIASTOLIC BLOOD PRESSURE: 74 MMHG

## 2023-04-05 DIAGNOSIS — M81.0 AGE-RELATED OSTEOPOROSIS WITHOUT CURRENT PATHOLOGICAL FRACTURE: ICD-10-CM

## 2023-04-05 DIAGNOSIS — M21.371 FOOT DROP, RIGHT FOOT: ICD-10-CM

## 2023-04-05 DIAGNOSIS — L89.893 PRESSURE INJURY OF DORSUM OF RIGHT FOOT, STAGE 3 (HCC): Primary | ICD-10-CM

## 2023-04-05 NOTE — PATIENT INSTRUCTIONS
Orders Placed This Encounter   Procedures    Wound cleansing and dressings     Right foot wound    Keep protected with felt around or gentle allevyn border  discharged today     Standing Status:   Future     Standing Expiration Date:   4/5/2024

## 2023-04-26 ENCOUNTER — OFFICE VISIT (OUTPATIENT)
Dept: OBGYN CLINIC | Facility: CLINIC | Age: 73
End: 2023-04-26

## 2023-04-26 VITALS — WEIGHT: 143 LBS | DIASTOLIC BLOOD PRESSURE: 60 MMHG | BODY MASS INDEX: 23.08 KG/M2 | SYSTOLIC BLOOD PRESSURE: 100 MMHG

## 2023-04-26 DIAGNOSIS — N95.2 VAGINAL ATROPHY: Primary | ICD-10-CM

## 2023-04-26 RX ORDER — CONJUGATED ESTROGENS 0.62 MG/G
0.5 CREAM VAGINAL DAILY
Qty: 30 G | Refills: 6 | Status: SHIPPED | OUTPATIENT
Start: 2023-04-26

## 2023-04-26 NOTE — PROGRESS NOTES
Assessment/Plan:         Diagnoses and all orders for this visit:    Vaginal atrophy  -     estrogens, conjugated (Premarin) vaginal cream; Insert 0 5 g into the vagina daily          Subjective:      Patient ID: Dana Hanks is a 67 y o  female  Patient is a 76-year-old postmenopausal female status post hysterectomy for endometriosis at age 27 who recently developed a DVT and is on Eliquis after years of oral Premarin therapy  She was concerned about vaginal dryness and dyspareunia and was given a prescription for Estrace vaginal cream   She recalls having Premarin vaginal cream in the past and would prefer to use that brand  A prescription for Premarin vaginal cream was given to the patient and she will use it as directed in the near future  She understands that it may be 6 to 8 weeks to have its full effect  She will come back for her annual exam in July  The following portions of the patient's history were reviewed and updated as appropriate: allergies, current medications, past family history, past medical history, past social history, past surgical history and problem list     Review of Systems  na    Objective:      /60 (BP Location: Left arm)   Wt 64 9 kg (143 lb)   BMI 23 08 kg/m²          Physical Exam  Vitals and nursing note reviewed  Exam conducted with a chaperone present  Constitutional:       Appearance: Normal appearance  She is normal weight  HENT:      Head: Normocephalic and atraumatic  Nose: Nose normal    Eyes:      Conjunctiva/sclera: Conjunctivae normal    Pulmonary:      Effort: Pulmonary effort is normal    Abdominal:      General: Abdomen is flat  Palpations: Abdomen is soft  Musculoskeletal:         General: Normal range of motion  Skin:     General: Skin is warm and dry  Neurological:      General: No focal deficit present  Mental Status: She is alert  Mental status is at baseline     Psychiatric:         Mood and Affect: Mood normal  Behavior: Behavior normal          Thought Content:  Thought content normal          Judgment: Judgment normal

## 2023-04-26 NOTE — PROGRESS NOTES
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, stage 3 ([facility])  -     Wound cleansing and dressings; Future  - Pad area properly on the top of her foot to prevent future breakdown  - Modification recommendations reviewed  - Follow-up as needed  Foot drop, right foot  -     Consider carbon graphite MAFO/dorsiflex assist brace in the future           Procedures       Wound 03/22/23 Pressure Injury Foot Anterior;Right;Dorsal (Active)   Wound Image Images linked 04/05/23 1049   Wound Description Epithelialization;Eschar 04/05/23 1047   Wound Length (cm) 0 cm 04/05/23 1047   Wound Width (cm) 0 cm 04/05/23 1047   Wound Depth (cm) 0 cm 04/05/23 1047   Wound Surface Area (cm^2) 0 cm^2 04/05/23 1047   Wound Volume (cm^3) 0 cm^3 04/05/23 1047   Calculated Wound Volume (cm^3) 0 cm^3 04/05/23 1047   Change in Wound Size % 100 04/05/23 1047   Drainage Amount None 04/05/23 1047   Non-staged Wound Description Not applicable 17/32/13 5943   Dressing Status Intact 04/05/23 1047       Wound 03/22/23 Pressure Injury Foot Anterior;Right;Dorsal (Active)   Date First Assessed/Time First Assessed: 03/22/23 1015   Pre-Existing Wound: Yes  Primary Wound Type: Pressure Injury  Location: Foot  Wound Location Orientation: Anterior;Right;Dorsal       Subjective:       35/2023: 23-year-old white female presents reporting that she has made good progress and has not had any drainage from her wound over the past few days  Is any new pain/discomfort  3/22/2023: 23-year-old white female presents concerned with chronic wound right foot which developed secondary to dropfoot brace irritating the top of her foot causing a wound    Has been soaking and trying her own home remedies but it still continues to drain  Recent diagnosis of DVT and initiated on Eliquis 2 weeks ago  The following portions of the patient's history were reviewed and updated as appropriate: allergies, current medications, past family history, past medical history, past social history, past surgical history and problem list     Review of Systems   Constitutional: Negative for chills and fever  HENT: Negative for ear pain and sore throat  Eyes: Negative for pain and visual disturbance  Respiratory: Negative for cough and shortness of breath  Cardiovascular: Negative for chest pain and palpitations  Gastrointestinal: Negative for abdominal pain and vomiting  Genitourinary: Negative for dysuria and hematuria  Musculoskeletal: Negative for arthralgias and back pain  Skin: Positive for wound (Right foot wound)  Negative for color change and rash  Neurological: Negative for seizures and syncope  All other systems reviewed and are negative  Objective:       Wound 03/22/23 Pressure Injury Foot Anterior;Right;Dorsal (Active)   Wound Image Images linked 04/05/23 1049   Wound Description Epithelialization;Eschar 04/05/23 1047   Wound Length (cm) 0 cm 04/05/23 1047   Wound Width (cm) 0 cm 04/05/23 1047   Wound Depth (cm) 0 cm 04/05/23 1047   Wound Surface Area (cm^2) 0 cm^2 04/05/23 1047   Wound Volume (cm^3) 0 cm^3 04/05/23 1047   Calculated Wound Volume (cm^3) 0 cm^3 04/05/23 1047   Change in Wound Size % 100 04/05/23 1047   Drainage Amount None 04/05/23 1047   Non-staged Wound Description Not applicable 22/89/06 7454   Dressing Status Intact 04/05/23 1047       /74   Pulse 80   Temp 98 6 °F (37 °C)   Resp 16     Physical Exam  Constitutional:       Appearance: Normal appearance  HENT:      Head: Normocephalic  Right Ear: Tympanic membrane normal       Left Ear: Tympanic membrane normal       Nose: No congestion        Mouth/Throat: Pharynx: No oropharyngeal exudate or posterior oropharyngeal erythema  Eyes:      Extraocular Movements: Extraocular movements intact  Conjunctiva/sclera: Conjunctivae normal       Pupils: Pupils are equal, round, and reactive to light  Cardiovascular:      Rate and Rhythm: Normal rate and regular rhythm  Pulses:           Dorsalis pedis pulses are 1+ on the right side and 1+ on the left side  Posterior tibial pulses are 1+ on the right side and 1+ on the left side  Pulmonary:      Effort: Pulmonary effort is normal    Musculoskeletal:      Right foot: Foot drop present  Comments: Dropfoot deformity right foot   Feet:      Right foot:      Skin integrity: Ulcer (Dorsal right midfoot wound closed 100%, dry no drainage no infection ) present  Skin:     General: Skin is warm and dry  Capillary Refill: Capillary refill takes 2 to 3 seconds  Coloration: Skin is not pale  Findings: No bruising, erythema, lesion or rash  Neurological:      General: No focal deficit present  Mental Status: She is alert  Cranial Nerves: No cranial nerve deficit  Sensory: No sensory deficit  Motor: No weakness  Gait: Gait normal       Deep Tendon Reflexes: Reflexes normal    Psychiatric:         Mood and Affect: Mood normal          Behavior: Behavior normal          Judgment: Judgment normal            Wound Instructions:  Orders Placed This Encounter   Procedures   • Wound cleansing and dressings     Right foot wound    Keep protected with felt around or gentle allevyn border  discharged today     Standing Status:   Future     Standing Expiration Date:   4/5/2024        Diagnosis ICD-10-CM Associated Orders   1  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

## 2023-05-08 ENCOUNTER — OFFICE VISIT (OUTPATIENT)
Dept: PODIATRY | Facility: CLINIC | Age: 73
End: 2023-05-08

## 2023-05-08 VITALS
WEIGHT: 145 LBS | HEART RATE: 71 BPM | HEIGHT: 66 IN | SYSTOLIC BLOOD PRESSURE: 120 MMHG | DIASTOLIC BLOOD PRESSURE: 74 MMHG | BODY MASS INDEX: 23.3 KG/M2

## 2023-05-08 DIAGNOSIS — M21.371 FOOT DROP, RIGHT: ICD-10-CM

## 2023-05-08 DIAGNOSIS — Z79.01 CHRONIC ANTICOAGULATION: ICD-10-CM

## 2023-05-08 DIAGNOSIS — L89.893 PRESSURE INJURY OF DORSUM OF RIGHT FOOT, STAGE 3 (HCC): ICD-10-CM

## 2023-05-08 DIAGNOSIS — I73.9 PERIPHERAL VASCULAR DISEASE, UNSPECIFIED (HCC): ICD-10-CM

## 2023-05-08 DIAGNOSIS — B35.1 ONYCHOMYCOSIS: Primary | ICD-10-CM

## 2023-06-03 NOTE — PROGRESS NOTES
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, stage 3 ([facility] )              Orders Placed This Encounter   Procedures   • Nail removal      PLAN:  The patient was educated in proper foot wear and how this led to her ulceration  Recommended carbon graphite dropfoot brace  Discontinue bandaging right foot as wound is closed, recommend padding this area to avoid recurrent breakdown  Also discussed daily foot assessment and proper foot care  The patient will follow up in 9-12 weeks for foot exam and care  Continue with topical antifungal medication for dystrophic nails bilateral     Procedures: 68405, 96568  All mycotic toenails (x2) were reduced and debrided in length, width, and girth using a nail nipper and dremel  All non-dystrphic nails (x8) also trimmed  Patient tolerated procedure(s) well without complications  Nail removal    Date/Time: 5/8/2023 11:45 AM    Performed by: El Bowen DPM  Authorized by: El Bowen DPM    Patient location:  Other (comment)Universal Protocol:  Consent: Verbal consent obtained  Risks and benefits: risks, benefits and alternatives were discussed  Consent given by: patient  Patient understanding: patient states understanding of the procedure being performed  Patient identity confirmed: verbally with patient      Nails trimmed:     Number of nails trimmed:  8  Post-procedure details:     Patient tolerance of procedure: Tolerated well, no immediate complications         HPI:  Robert Garnett is a 67 y  o year old female seen for at risk foot exam and care  Patient also seen for follow-up dorsal right foot wound and associated dropfoot deformity with external bracing contributed to wound formation       The patient complained of elongated thick painful "toenails  The patient has class findings with peripheral vascular disease  The patient denied any acute pedal disorder, redness, acute swelling, or recent injury  The following portions of the patient's history were reviewed and updated as appropriate: allergies, current medications, past family history, past medical history, past social history, past surgical history and problem list     REVIEW OF SYSTEMS:  GENERAL: No fever or chills  HEART: No chest pain, or palpitation  RESPIRATORY:  No acute SOB or cough  GI: No Nausea, vomit or diarrhea  NEUROLOGIC: No syncope or acute weakness    PHYSICAL EXAM:    /74   Pulse 71   Ht 5' 6\" (1 676 m)   Wt 65 8 kg (145 lb)   BMI 23 40 kg/m²     VASCULAR EXAM  Posterior tibial artery  absent bilateral     Dorsalis pedis artery absent bilateral   The patient has class findings with skin atrophy, lack of digital hair, and nail dystrophy  There is no lower extremity edema bilaterally  NEUROLOGIC EXAM  Sensation is intact to light touch  Sensation is intact to 10gm monofilament  No focal neurologic deficit  DERMATOLOGIC EXAM:   No ulcer or cellulitis noted  Texture, Tone and Turgor are diminished with moderate atrophic changes noted  The patient has dystrophic/hypertrophic toenails with yellow/white discoloration, onycholysis, and subungal debris  Fungal odor and brittle nature noted  Pain with palpation  Periungual erythema noted  Right foot nails dystrophic x1 with 0 5 cm ave thickness (#4)  Left foot nails dystrophic x1 with 0 5 cm ave thickness (#3)  Patient has hyperkeratotic lesions noted:   Right foot located at none  Left foot located at none  No notable suspicious skin lesions  MUSCULOSKELETAL EXAM:   No acute joint pain, edema, or redness  No acute musculoskeletal problem  Patient has right dropfoot, and mild digital contracture deformities noted  Limited ambulation with stepping gait secondary to dropfoot     " Risk Category/Class Findings:   Q8(B1, B3)    A1)  Has the patient had a previous amputation of the foot or integral skeletal portion thereof? No  B1)  Does the patient have absent posterior tibial pulse? Yes  B3)  Does the patient have absent dorsalis pedis? Yes  B2)  Does the patient have three of the following? Yes           1  Hair growth (increased or decreased), 2   Nail changes (thickening) and 3  Pigmentary changes (discoloring)  C)  Does the patient have two of the following and one above?  No

## 2023-06-07 ENCOUNTER — HOSPITAL ENCOUNTER (OUTPATIENT)
Dept: NON INVASIVE DIAGNOSTICS | Facility: CLINIC | Age: 73
Discharge: HOME | End: 2023-06-07
Payer: MEDICARE

## 2023-06-07 DIAGNOSIS — Z86.718 ENCOUNTER FOR FOLLOW-UP OF ACUTE DEEP VEIN THROMBOSIS (DVT) OF RIGHT LOWER EXTREMITY: ICD-10-CM

## 2023-06-07 DIAGNOSIS — Z09 ENCOUNTER FOR FOLLOW-UP OF ACUTE DEEP VEIN THROMBOSIS (DVT) OF RIGHT LOWER EXTREMITY: ICD-10-CM

## 2023-06-07 PROCEDURE — 93971 EXTREMITY STUDY: CPT | Performed by: SURGERY

## 2023-06-07 PROCEDURE — 93971 EXTREMITY STUDY: CPT

## 2023-07-10 ENCOUNTER — ANNUAL EXAM (OUTPATIENT)
Dept: OBGYN CLINIC | Facility: CLINIC | Age: 73
End: 2023-07-10
Payer: MEDICARE

## 2023-07-10 VITALS
DIASTOLIC BLOOD PRESSURE: 70 MMHG | WEIGHT: 146 LBS | SYSTOLIC BLOOD PRESSURE: 128 MMHG | BODY MASS INDEX: 23.46 KG/M2 | HEIGHT: 66 IN

## 2023-07-10 DIAGNOSIS — Z12.72 SCREENING FOR VAGINAL CANCER: ICD-10-CM

## 2023-07-10 DIAGNOSIS — Z01.419 ENCOUNTER FOR GYNECOLOGICAL EXAMINATION WITHOUT ABNORMAL FINDING: Primary | ICD-10-CM

## 2023-07-10 PROCEDURE — G0101 CA SCREEN;PELVIC/BREAST EXAM: HCPCS | Performed by: OBSTETRICS & GYNECOLOGY

## 2023-07-10 PROCEDURE — G0145 SCR C/V CYTO,THINLAYER,RESCR: HCPCS | Performed by: OBSTETRICS & GYNECOLOGY

## 2023-07-10 NOTE — PROGRESS NOTES
Assessment/Plan:         Diagnoses and all orders for this visit:    Screening for vaginal cancer  -     Liquid-based pap, screening          Subjective:      Patient ID: Anne Zurita is a 75 y.o. female. The patient is a 50-year-old postmenopausal female who underwent hysterectomy at age 27 for severe endometriosis with involvement of the sciatic nerve which resulted in right foot drop. She also has had a right knee replacement fairly several years ago. She had total abdominal hysterectomy with bilateral salpingo-oophorectomy for the endometriosis and was placed on Premarin as an estrogen replacement source. Is been taking Premarin from age 27 until the present time. She would like to continue on the Premarin because she feels that it decreases skin dryness, improves her bone health and decreases risk of osteoporosis, prevents dyspareunia, improved cognition, and provides her with a sense of wellbeing. She has tried on several occasions to stop the Premarin, and has not felt well without it.     She developed a non-occlusive DVT in her right calf in March of this year which was diagnosed on doppler study. In June, evidence of sub-acute or chronic DVT was noted in the same area, although she has no pain or swelling. She is completed an 8 week course of Eliquis. Her family doctor recommended that she discontinue the estrogen, but she didn't  want to, based on the reasons above. She has taken vaginal estrogen since April of 2023 and has discontinued oral estrogen; she does not feel well at all. Most of her symptoms include melancholia, loss of sense of well being, fatigue, and other symptoms related to depression. She is adamant that she does not want to take an antidepressant medication, although it may relieve some of her symptoms.     We discussed the above assumptions, and I pointed out that they are probably not based in current scientific research.   The [employer] of obstetricians and gynecologist recommends using estrogen in the lowest amount for the shortest period of time for resolution of menopausal symptoms. Taking estrogen replacement from an early menopause of age 27 until the normal age of menopause at approximately 45 was almost certainly of benefit and probably reduced her risk of heart disease, bone loss, and vaginal atrophy. The only symptom likely to continue to grow worse without estrogen at age 67 is vaginal atrophy. She is  and sexually active. Vaginal estrogen replacement in the usual doses is unlikely to result in thromboembolic phenomena because of minimal systemic absorption. Options, risks and benefits were discussed, and a prescription for vaginal estradiol cream was sent to the pharmacy. She will begin using it 2x per week. The severity and seriousness of the thromboembolic phenomena which can result from hypercoagulability which may in part be due to oral estrogen, including fatal pulmonary emboli, cerebrovascular accidents, and myocardial infarctions, was stressed to her. Any of these clots have been well described as fatal in certain circumstances. She understands that the natural aging process of the endothelial lining of blood vessels increases the risk of clotting phenomenon regardless of the addition of estrogen. It is possible that a transdermal estrogen might be less likely to cause thromboembolic events. She is scheduled to see her PCP tomorrow, and she will also discuss it with him. The following portions of the patient's history were reviewed and updated as appropriate: allergies, current medications, past family history, past medical history, past social history, past surgical history and problem list.    Review of Systems   Constitutional: Negative for chills, diaphoresis, fatigue, fever and unexpected weight change. HENT: Negative for congestion, sinus pressure, sinus pain, tinnitus and trouble swallowing. Eyes: Negative for visual disturbance. Respiratory: Negative for cough, chest tightness and shortness of breath. Cardiovascular: Negative for chest pain, palpitations and leg swelling. Gastrointestinal: Negative for abdominal distention, abdominal pain, anal bleeding, constipation, diarrhea, nausea, rectal pain and vomiting. Endocrine: Negative for heat intolerance. Genitourinary: Negative for difficulty urinating, dysuria, flank pain, frequency, genital sores, hematuria and urgency. Musculoskeletal: Negative for arthralgias, back pain and joint swelling. Skin: Negative for rash. Allergic/Immunologic: Negative for environmental allergies and food allergies. Neurological: Negative for headaches. Hematological: Negative for adenopathy. Does not bruise/bleed easily. Psychiatric/Behavioral: Negative for decreased concentration and dysphoric mood. The patient is not nervous/anxious. Objective:      /70 (BP Location: Left arm)   Ht 5' 6" (1.676 m)   Wt 66.2 kg (146 lb)   BMI 23.57 kg/m²          Physical Exam  Vitals and nursing note reviewed. Exam conducted with a chaperone present. Constitutional:       General: She is not in acute distress. Appearance: Normal appearance. She is not ill-appearing. HENT:      Head: Normocephalic and atraumatic. Nose: Nose normal.      Mouth/Throat:      Mouth: Mucous membranes are moist.      Pharynx: Oropharynx is clear. Eyes:      Extraocular Movements: Extraocular movements intact. Cardiovascular:      Rate and Rhythm: Normal rate and regular rhythm. Pulses: Normal pulses. Heart sounds: Normal heart sounds. No murmur heard. No friction rub. Pulmonary:      Effort: Pulmonary effort is normal.      Breath sounds: Normal breath sounds. Chest:   Breasts:     Right: Normal. No mass or tenderness. Left: Normal. No mass or tenderness. Abdominal:      General: Abdomen is flat. Bowel sounds are normal. There is no distension.       Palpations: Abdomen is soft. There is no mass. Tenderness: There is no abdominal tenderness. There is no guarding. Hernia: No hernia is present. Genitourinary:     General: Normal vulva. Exam position: Lithotomy position. Pb stage (genital): 5. Labia:         Right: No rash, tenderness or lesion. Left: No rash, tenderness or lesion. Vagina: Normal.      Uterus: Absent. Adnexa: Right adnexa normal and left adnexa normal.        Right: No mass or tenderness. Left: No mass. Musculoskeletal:      Cervical back: Normal range of motion and neck supple. Lymphadenopathy:      Upper Body:      Right upper body: No axillary adenopathy. Left upper body: No axillary adenopathy. Skin:     General: Skin is warm and dry. Neurological:      General: No focal deficit present. Mental Status: She is alert and oriented to person, place, and time. Psychiatric:         Mood and Affect: Mood normal.         Behavior: Behavior normal.         Thought Content:  Thought content normal.         Judgment: Judgment normal.

## 2023-07-17 ENCOUNTER — OFFICE VISIT (OUTPATIENT)
Dept: PODIATRY | Facility: CLINIC | Age: 73
End: 2023-07-17
Payer: MEDICARE

## 2023-07-17 VITALS
DIASTOLIC BLOOD PRESSURE: 83 MMHG | HEART RATE: 72 BPM | BODY MASS INDEX: 23.46 KG/M2 | SYSTOLIC BLOOD PRESSURE: 149 MMHG | WEIGHT: 146 LBS | HEIGHT: 66 IN

## 2023-07-17 DIAGNOSIS — Z79.01 CHRONIC ANTICOAGULATION: ICD-10-CM

## 2023-07-17 DIAGNOSIS — I73.9 PERIPHERAL VASCULAR DISEASE, UNSPECIFIED (HCC): ICD-10-CM

## 2023-07-17 DIAGNOSIS — B35.1 ONYCHOMYCOSIS: Primary | ICD-10-CM

## 2023-07-17 PROCEDURE — 11721 DEBRIDE NAIL 6 OR MORE: CPT | Performed by: PODIATRIST

## 2023-07-20 LAB
LAB AP GYN PRIMARY INTERPRETATION: NORMAL
Lab: NORMAL

## 2023-07-27 NOTE — PROGRESS NOTES
PATIENT:  Yordy Lopez  1950    ASSESSMENT:  1. Onychomycosis        2. Peripheral vascular disease, unspecified ([address])  Nail removal      3. Chronic anticoagulation              Orders Placed This Encounter   Procedures   • Nail removal      PLAN:  The patient was educated in proper foot wear and how this led to her ulceration. Discussed daily foot assessment and proper foot care. The patient will follow up in 9-12 weeks for foot exam and care. Continue with topical antifungal medication for dystrophic nails bilateral.    Procedures: 96661, 31808  All mycotic toenails (x2) were reduced and debrided in length, width, and girth using a nail nipper and dremel. All non-dystrphic nails (x8) also trimmed. Patient tolerated procedure(s) well without complications. Nail removal    Date/Time: 7/17/2023 11:30 AM    Performed by: Tamika Brady DPM  Authorized by: Tamika Brady DPM    Patient location:  Other (comment)  Indications / Diagnosis:  Elongated nondystrophic nails x8  Universal Protocol:  Consent: Verbal consent obtained. Risks and benefits: risks, benefits and alternatives were discussed  Consent given by: patient  Patient understanding: patient states understanding of the procedure being performed  Patient identity confirmed: verbally with patient      Nails trimmed:     Number of nails trimmed:  8  Post-procedure details:     Patient tolerance of procedure: Tolerated well, no immediate complications         HPI:  Yordy Lopez is a 75 y. o.year old female seen for at risk foot exam and care. The patient complained of elongated thick painful toenails. The patient has class findings with peripheral vascular disease. Clinically unchanged from prior evaluation. The patient denied any acute pedal disorder, redness, acute swelling, or recent injury.       The following portions of the patient's history were reviewed and updated as appropriate: allergies, current medications, past family history, past medical history, past social history, past surgical history and problem list.    REVIEW OF SYSTEMS:  GENERAL: No fever or chills  HEART: No chest pain, or palpitation  RESPIRATORY:  No acute SOB or cough  GI: No Nausea, vomit or diarrhea  NEUROLOGIC: No syncope or acute weakness    PHYSICAL EXAM:    /83   Pulse 72   Ht 5' 6" (1.676 m)   Wt 66.2 kg (146 lb)   BMI 23.57 kg/m²     VASCULAR EXAM  Posterior tibial artery  absent bilateral.    Dorsalis pedis artery absent bilateral.  The patient has class findings with skin atrophy, lack of digital hair, and nail dystrophy. There is no lower extremity edema bilaterally. NEUROLOGIC EXAM  Sensation is intact to light touch. Sensation is intact to 10gm monofilament. No focal neurologic deficit. DERMATOLOGIC EXAM:   No ulcer or cellulitis noted. Texture, Tone and Turgor are diminished with moderate atrophic changes noted. The patient has dystrophic/hypertrophic toenails with yellow/white discoloration, onycholysis, and subungal debris. Fungal odor and brittle nature noted. Pain with palpation. Periungual erythema noted. Right foot nails dystrophic x1 with 0.5 cm ave thickness (#4)  Left foot nails dystrophic x1 with 0.5 cm ave thickness (#3)  Patient has hyperkeratotic lesions noted:   Right foot located at none. Left foot located at none. No notable suspicious skin lesions. MUSCULOSKELETAL EXAM:   No acute joint pain, edema, or redness. No acute musculoskeletal problem. Patient has right dropfoot, and mild digital contracture deformities noted. Limited ambulation with stepping gait secondary to dropfoot. Risk Category/Class Findings:   Q8(B1, B3)    A1)  Has the patient had a previous amputation of the foot or integral skeletal portion thereof? No  B1)  Does the patient have absent posterior tibial pulse? Yes  B3)  Does the patient have absent dorsalis pedis?  Yes  B2)  Does the patient have three of the following? Yes           1. Hair growth (increased or decreased), 2.  Nail changes (thickening) and 3. Pigmentary changes (discoloring)  C)  Does the patient have two of the following and one above?  No

## 2023-09-12 ENCOUNTER — OFFICE VISIT (OUTPATIENT)
Dept: OBGYN CLINIC | Facility: CLINIC | Age: 73
End: 2023-09-12
Payer: MEDICARE

## 2023-09-12 VITALS
BODY MASS INDEX: 23.46 KG/M2 | WEIGHT: 146 LBS | DIASTOLIC BLOOD PRESSURE: 72 MMHG | SYSTOLIC BLOOD PRESSURE: 138 MMHG | HEIGHT: 66 IN

## 2023-09-12 DIAGNOSIS — N95.1 MENOPAUSAL SYMPTOM: Primary | ICD-10-CM

## 2023-09-12 DIAGNOSIS — Z86.718 PERSONAL HISTORY OF VENOUS THROMBOSIS AND EMBOLISM: ICD-10-CM

## 2023-09-12 PROCEDURE — 99213 OFFICE O/P EST LOW 20 MIN: CPT | Performed by: OBSTETRICS & GYNECOLOGY

## 2023-09-12 RX ORDER — ESTRADIOL 0.25 MG/.25G
1 GEL TOPICAL DAILY
Qty: 30 EACH | Refills: 12 | Status: SHIPPED | OUTPATIENT
Start: 2023-09-12 | End: 2024-09-11

## 2023-09-12 NOTE — PROGRESS NOTES
Assessment/Plan:         Diagnoses and all orders for this visit:    Menopausal symptom  -     Estradiol (Divigel) 0.25 MG/0.25GM GEL; Place 1 Application on the skin in the morning          Subjective:      Patient ID: Regan Johnson is a 75 y.o. female. Patient is a 21-year-old  1 para 1 post menopausal female who underwent hysterectomy at age 27 for endometriosis. She has been taking Premarin 0.625 mg since the time of her hysterectomy and has done well on this but developed a DVT earlier this year. She was placed on Eliquis and it was recommended that she stop all estrogen replacement. We initially tried vaginal estrogen for vaginal atrophy symptoms. She did not feel well at all without the estrogen and her family doctor has placed her back 1 to Eliquis with Premarin 0.3 orally. We had discussed earlier that it might be less risky to take a transdermal estrogen replacement estrogen product either a patch or gel, which would be less likely to cause thromboembolic phenomenon. Divigel (or generic) 0.25 mg daily was prescribed and she will attempt to taper with time. She would like to eventually be able to stop both the estrogen replacement and the anticoagulation medication      The following portions of the patient's history were reviewed and updated as appropriate: allergies, current medications, past family history, past medical history, past social history, past surgical history and problem list.    Review of Systems   Constitutional: Negative for chills, diaphoresis, fatigue, fever and unexpected weight change. HENT: Negative for congestion, sinus pressure, sinus pain, tinnitus and trouble swallowing. Eyes: Negative for visual disturbance. Respiratory: Negative for cough, chest tightness and shortness of breath. Cardiovascular: Negative for chest pain, palpitations and leg swelling.    Gastrointestinal: Negative for abdominal distention, abdominal pain, anal bleeding, constipation, diarrhea, nausea, rectal pain and vomiting. Endocrine: Negative for heat intolerance. Genitourinary: Negative for difficulty urinating, dysuria, flank pain, frequency, genital sores, hematuria and urgency. Musculoskeletal: Negative for arthralgias, back pain and joint swelling. Skin: Negative for rash. Allergic/Immunologic: Negative for environmental allergies and food allergies. Neurological: Negative for headaches. Hematological: Negative for adenopathy. Does not bruise/bleed easily. Psychiatric/Behavioral: Negative for decreased concentration and dysphoric mood. The patient is not nervous/anxious.           Objective:      /72 (BP Location: Left arm)   Ht 5' 6" (1.676 m)   Wt 66.2 kg (146 lb)   BMI 23.57 kg/m²          Physical Exam

## 2023-09-13 ENCOUNTER — TELEPHONE (OUTPATIENT)
Dept: OBGYN CLINIC | Facility: CLINIC | Age: 73
End: 2023-09-13

## 2023-09-13 NOTE — TELEPHONE ENCOUNTER
Pharmacy called, Estradiol (Divigel) 0.25 MG/0.25GM GEL was called into pharmacy only and is brand name only and is not covered by her insurance, is their a different medi ation that you can call in,

## 2023-09-25 ENCOUNTER — OFFICE VISIT (OUTPATIENT)
Dept: PODIATRY | Facility: CLINIC | Age: 73
End: 2023-09-25
Payer: MEDICARE

## 2023-09-25 ENCOUNTER — HOSPITAL ENCOUNTER (OUTPATIENT)
Dept: MAMMOGRAPHY | Facility: IMAGING CENTER | Age: 73
Discharge: HOME | End: 2023-09-25
Payer: MEDICARE

## 2023-09-25 VITALS
HEART RATE: 71 BPM | BODY MASS INDEX: 23.46 KG/M2 | DIASTOLIC BLOOD PRESSURE: 84 MMHG | HEIGHT: 66 IN | WEIGHT: 146 LBS | SYSTOLIC BLOOD PRESSURE: 139 MMHG

## 2023-09-25 DIAGNOSIS — Z12.31 ENCOUNTER FOR SCREENING MAMMOGRAM FOR MALIGNANT NEOPLASM OF BREAST: ICD-10-CM

## 2023-09-25 DIAGNOSIS — B35.1 ONYCHOMYCOSIS: Primary | ICD-10-CM

## 2023-09-25 DIAGNOSIS — I73.9 PERIPHERAL VASCULAR DISEASE, UNSPECIFIED (HCC): ICD-10-CM

## 2023-09-25 DIAGNOSIS — Z79.01 CHRONIC ANTICOAGULATION: ICD-10-CM

## 2023-09-25 PROCEDURE — 77063 BREAST TOMOSYNTHESIS BI: CPT

## 2023-09-25 PROCEDURE — 77067 SCR MAMMO BI INCL CAD: CPT

## 2023-09-25 PROCEDURE — 11721 DEBRIDE NAIL 6 OR MORE: CPT | Performed by: PODIATRIST

## 2023-09-26 NOTE — PROGRESS NOTES
PATIENT:  Kaitlyn Jeronimo  1950    ASSESSMENT:  1. Onychomycosis        2. Peripheral vascular disease, unspecified ([address])  Nail removal      3. Chronic anticoagulation              Orders Placed This Encounter   Procedures   • Nail removal      PLAN:  The patient was educated in proper foot wear and how this led to her ulceration. Discussed daily foot assessment and proper foot care. The patient will follow up in 9-12 weeks for foot exam and care. Continue with topical antifungal medication for dystrophic nails bilateral.    Procedures: 19157, 02210  All mycotic toenails (x2) were reduced and debrided in length, width, and girth using a nail nipper and dremel. All non-dystrphic nails (x8) also trimmed. Patient tolerated procedure(s) well without complications. Nail removal    Date/Time: 9/25/2023 10:45 AM    Performed by: Argentina Snider DPM  Authorized by: Argentina Snider DPM    Patient location:  Other (comment)Universal Protocol:  Consent: Verbal consent obtained. Risks and benefits: risks, benefits and alternatives were discussed  Consent given by: patient  Patient understanding: patient states understanding of the procedure being performed  Patient identity confirmed: verbally with patient      Nails trimmed:     Number of nails trimmed:  8  Post-procedure details:     Patient tolerance of procedure: Tolerated well, no immediate complications         HPI:  Kaitlyn Jeronimo is a 75 y. o.year old female seen for at risk foot exam and care. Overall unchanged clinically from last visit. The patient complained of elongated thick painful toenails. The patient has class findings with peripheral vascular disease. Clinically unchanged from prior evaluation. The patient denied any acute pedal disorder, redness, acute swelling, or recent injury.       The following portions of the patient's history were reviewed and updated as appropriate: allergies, current medications, past family history, past medical history, past social history, past surgical history and problem list.    REVIEW OF SYSTEMS:  GENERAL: No fever or chills  HEART: No chest pain, or palpitation  RESPIRATORY:  No acute SOB or cough  GI: No Nausea, vomit or diarrhea  NEUROLOGIC: No syncope or acute weakness    PHYSICAL EXAM:    /84   Pulse 71   Ht 5' 6" (1.676 m)   Wt 66.2 kg (146 lb)   BMI 23.57 kg/m²     VASCULAR EXAM  Posterior tibial artery  absent bilateral.    Dorsalis pedis artery absent bilateral.  The patient has class findings with skin atrophy, lack of digital hair, and nail dystrophy. There is no lower extremity edema bilaterally. NEUROLOGIC EXAM  Sensation is intact to light touch. Sensation is intact to 10gm monofilament. No focal neurologic deficit. DERMATOLOGIC EXAM:   No ulcer or cellulitis noted. Texture, Tone and Turgor are diminished with moderate atrophic changes noted. The patient has dystrophic/hypertrophic toenails with yellow/white discoloration, onycholysis, and subungal debris. Fungal odor and brittle nature noted. Pain with palpation. Periungual erythema noted. Right foot nails dystrophic x1 with 0.5 cm ave thickness (#4)  Left foot nails dystrophic x1 with 0.5 cm ave thickness (#3)  Patient has hyperkeratotic lesions noted:   Right foot located at none. Left foot located at none. No notable suspicious skin lesions. MUSCULOSKELETAL EXAM:   No acute joint pain, edema, or redness. No acute musculoskeletal problem. Patient has right dropfoot, and mild digital contracture deformities noted. Limited ambulation with stepping gait secondary to dropfoot. Risk Category/Class Findings:   Q8(B1, B3)    A1)  Has the patient had a previous amputation of the foot or integral skeletal portion thereof? No  B1)  Does the patient have absent posterior tibial pulse? Yes  B3)  Does the patient have absent dorsalis pedis? Yes  B2)  Does the patient have three of the following?  Yes 1.  Hair growth (increased or decreased), 2.  Nail changes (thickening) and 3. Pigmentary changes (discoloring)  C)  Does the patient have two of the following and one above?  No

## 2023-11-13 ENCOUNTER — HOSPITAL ENCOUNTER (OUTPATIENT)
Dept: MAMMOGRAPHY | Facility: CLINIC | Age: 73
Discharge: HOME | End: 2023-11-13
Payer: MEDICARE

## 2023-11-13 ENCOUNTER — HOSPITAL ENCOUNTER (OUTPATIENT)
Dept: ULTRASOUND IMAGING | Facility: CLINIC | Age: 73
Discharge: HOME | End: 2023-11-13
Payer: MEDICARE

## 2023-11-13 DIAGNOSIS — R92.8 ABNORMAL FINDING ON BREAST IMAGING: ICD-10-CM

## 2023-11-13 PROCEDURE — G0279 TOMOSYNTHESIS, MAMMO: HCPCS

## 2023-11-13 PROCEDURE — 77065 DX MAMMO INCL CAD UNI: CPT

## 2023-11-13 PROCEDURE — 76642 ULTRASOUND BREAST LIMITED: CPT

## 2023-12-07 ENCOUNTER — OFFICE VISIT (OUTPATIENT)
Dept: PODIATRY | Facility: CLINIC | Age: 73
End: 2023-12-07
Payer: MEDICARE

## 2023-12-07 VITALS
DIASTOLIC BLOOD PRESSURE: 70 MMHG | WEIGHT: 146 LBS | SYSTOLIC BLOOD PRESSURE: 146 MMHG | HEIGHT: 66 IN | HEART RATE: 70 BPM | BODY MASS INDEX: 23.46 KG/M2

## 2023-12-07 DIAGNOSIS — B35.1 ONYCHOMYCOSIS: Primary | ICD-10-CM

## 2023-12-07 DIAGNOSIS — Z79.01 CHRONIC ANTICOAGULATION: ICD-10-CM

## 2023-12-07 DIAGNOSIS — I73.9 PERIPHERAL VASCULAR DISEASE, UNSPECIFIED (HCC): ICD-10-CM

## 2023-12-07 PROCEDURE — 11721 DEBRIDE NAIL 6 OR MORE: CPT | Performed by: PODIATRIST

## 2024-01-11 ENCOUNTER — OFFICE VISIT (OUTPATIENT)
Dept: PODIATRY | Facility: CLINIC | Age: 74
End: 2024-01-11
Payer: MEDICARE

## 2024-01-11 VITALS
HEIGHT: 69 IN | DIASTOLIC BLOOD PRESSURE: 67 MMHG | WEIGHT: 140 LBS | HEART RATE: 76 BPM | BODY MASS INDEX: 20.73 KG/M2 | SYSTOLIC BLOOD PRESSURE: 117 MMHG

## 2024-01-11 DIAGNOSIS — B35.1 ONYCHOMYCOSIS: Primary | ICD-10-CM

## 2024-01-11 DIAGNOSIS — Z79.01 CHRONIC ANTICOAGULATION: ICD-10-CM

## 2024-01-11 DIAGNOSIS — I73.9 PERIPHERAL VASCULAR DISEASE, UNSPECIFIED (HCC): ICD-10-CM

## 2024-01-11 PROCEDURE — 11721 DEBRIDE NAIL 6 OR MORE: CPT | Performed by: PODIATRIST

## 2024-01-11 NOTE — PROGRESS NOTES
PATIENT:  Aurelia Romeo  1950    ASSESSMENT:  1. Onychomycosis        2. Peripheral vascular disease, unspecified ([facility])  Nail removal      3. Chronic anticoagulation              Orders Placed This Encounter   Procedures   • Nail removal        PLAN:  The patient was educated in proper foot wear and how this led to her ulceration.  Discussed daily foot assessment and proper foot care.    The patient will follow up in 9-12 weeks for foot exam and care.    Continue with topical antifungal medication for dystrophic nails bilateral.    Procedures: 13147, 76659  All mycotic toenails (x2) were reduced and debrided in length, width, and girth using a nail nipper and dremel.    All non-dystrphic nails (x8) also trimmed.     Patient tolerated procedure(s) well without complications.    Nail removal    Date/Time: 1/11/2024 9:00 AM    Performed by: Godfrey Hinojosa DPM  Authorized by: Godfrey Hinojosa DPM    Patient location:  Other (comment)Universal Protocol:  Consent: Verbal consent obtained.  Risks and benefits: risks, benefits and alternatives were discussed  Consent given by: patient  Patient understanding: patient states understanding of the procedure being performed  Patient identity confirmed: verbally with patient    Nails trimmed:     Number of nails trimmed:  8  Post-procedure details:     Patient tolerance of procedure:  Tolerated well, no immediate complications       HPI:  Aurleia Romeo is a 73 y.o.year old female seen for at risk foot exam and care.  Overall unchanged clinically from last visit.  The patient complained of elongated thick painful toenails.  The patient has class findings with peripheral vascular disease.  Clinically unchanged from prior evaluation.  The patient denied any acute pedal disorder, redness, acute swelling, or recent injury.      The following portions of the patient's history were reviewed and updated as appropriate: allergies, current medications, past family history, past  "medical history, past social history, past surgical history and problem list.    REVIEW OF SYSTEMS:  GENERAL: No fever or chills  HEART: No chest pain, or palpitation  RESPIRATORY:  No acute SOB or cough  GI: No Nausea, vomit or diarrhea  NEUROLOGIC: No syncope or acute weakness    PHYSICAL EXAM:    /67 (BP Location: Right arm, Patient Position: Sitting, Cuff Size: Adult)   Pulse 76   Ht 5' 9\" (1.753 m) Comment: stated  Wt 63.5 kg (140 lb)   BMI 20.67 kg/m²     VASCULAR EXAM  Posterior tibial artery  absent bilateral.    Dorsalis pedis artery absent bilateral.  The patient has class findings with skin atrophy, lack of digital hair, and nail dystrophy.    There is no lower extremity edema bilaterally.      NEUROLOGIC EXAM  Sensation is intact to light touch.  Sensation is intact to 10gm monofilament.    No focal neurologic deficit.          DERMATOLOGIC EXAM:   No ulcer or cellulitis noted.  Texture, Tone and Turgor are diminished with moderate atrophic changes noted.  The patient has dystrophic/hypertrophic toenails with yellow/white discoloration, onycholysis, and subungal debris.   Fungal odor and brittle nature noted.  Pain with palpation.  Periungual erythema noted.  Right foot nails dystrophic x1 with 0.5 cm ave thickness (#4)  Left foot nails dystrophic x1 with 0.5 cm ave thickness (#3)  Patient has hyperkeratotic lesions noted:   Right foot located at none.  Left foot located at none.  No notable suspicious skin lesions.      MUSCULOSKELETAL EXAM:   No acute joint pain, edema, or redness.  No acute musculoskeletal problem.    Patient has right dropfoot, and mild digital contracture deformities noted.  Limited ambulation with stepping gait secondary to dropfoot.       Risk Category/Class Findings:   Q8(B1, B3)    A1)  Has the patient had a previous amputation of the foot or integral skeletal portion thereof? No  B1)  Does the patient have absent posterior tibial pulse? Yes  B3)  Does the patient have " absent dorsalis pedis? Yes  B2)  Does the patient have three of the following? Yes           1.  Hair growth (increased or decreased), 2.  Nail changes (thickening) and 3.  Pigmentary changes (discoloring)  C)  Does the patient have two of the following and one above? No

## 2024-02-11 NOTE — PROGRESS NOTES
PATIENT:  Aurelia Romeo  1950    ASSESSMENT:  1. Onychomycosis        2. Peripheral vascular disease, unspecified (HCC)  Nail removal      3. Chronic anticoagulation              Orders Placed This Encounter   Procedures   • Nail removal        PLAN:  The patient was educated in proper foot wear and how this led to her ulceration.  Discussed daily foot assessment and proper foot care.    The patient will follow up in 9-12 weeks for foot exam and care.    Continue with topical antifungal medication for dystrophic nails bilateral.    Procedures: 23752, 69026  All mycotic toenails (x2) were reduced and debrided in length, width, and girth using a nail nipper and dremel.    All non-dystrphic nails (x8) also trimmed.     Patient tolerated procedure(s) well without complications.    Nail removal    Date/Time: 12/7/2023 3:00 PM    Performed by: Godfrey Hinojosa DPM  Authorized by: Godfrey Hinojosa DPM    Patient location:  Other (comment)Universal Protocol:  Consent: Verbal consent obtained.  Risks and benefits: risks, benefits and alternatives were discussed  Consent given by: patient  Patient understanding: patient states understanding of the procedure being performed  Patient identity confirmed: verbally with patient    Nails trimmed:     Number of nails trimmed:  8  Post-procedure details:     Patient tolerance of procedure:  Tolerated well, no immediate complications       HPI:  Aurelia Romeo is a 73 y.o.year old female seen for at risk foot exam and care.  Overall unchanged clinically from last visit.  Concerned with elongated thick painful toenails.  The patient has class findings with peripheral vascular disease.  Clinically unchanged from prior evaluation.  The patient denied any acute pedal disorder, redness, acute swelling, or recent injury.      The following portions of the patient's history were reviewed and updated as appropriate: allergies, current medications, past family history, past medical  "history, past social history, past surgical history and problem list.    REVIEW OF SYSTEMS:  GENERAL: No fever or chills  HEART: No chest pain, or palpitation  RESPIRATORY:  No acute SOB or cough  GI: No Nausea, vomit or diarrhea  NEUROLOGIC: No syncope or acute weakness    PHYSICAL EXAM:    /70   Pulse 70   Ht 5' 6\" (1.676 m)   Wt 66.2 kg (146 lb)   BMI 23.57 kg/m²     VASCULAR EXAM  Posterior tibial artery  absent bilateral.    Dorsalis pedis artery absent bilateral.  The patient has class findings with skin atrophy, lack of digital hair, and nail dystrophy.    There is no lower extremity edema bilaterally.      NEUROLOGIC EXAM  Sensation is intact to light touch.  Sensation is intact to 10gm monofilament.    No focal neurologic deficit.          DERMATOLOGIC EXAM:   No ulcer or cellulitis noted.  Texture, Tone and Turgor are diminished with moderate atrophic changes noted.  The patient has dystrophic/hypertrophic toenails with yellow/white discoloration, onycholysis, and subungal debris.   Fungal odor and brittle nature noted.  Pain with palpation.  Periungual erythema noted.  Right foot nails dystrophic x1 with 0.5 cm ave thickness (#4)  Left foot nails dystrophic x1 with 0.5 cm ave thickness (#3)  Patient has hyperkeratotic lesions noted:   Right foot located at none.  Left foot located at none.  No notable suspicious skin lesions.      MUSCULOSKELETAL EXAM:   No acute joint pain, edema, or redness.  No acute musculoskeletal problem.    Patient has right dropfoot, and mild digital contracture deformities noted.  Limited ambulation with stepping gait secondary to dropfoot.       Risk Category/Class Findings:   Q8(B1, B3)    A1)  Has the patient had a previous amputation of the foot or integral skeletal portion thereof? No  B1)  Does the patient have absent posterior tibial pulse? Yes  B3)  Does the patient have absent dorsalis pedis? Yes  B2)  Does the patient have three of the following? Yes           " 1.  Hair growth (increased or decreased), 2.  Nail changes (thickening) and 3.  Pigmentary changes (discoloring)  C)  Does the patient have two of the following and one above? No

## 2024-02-15 ENCOUNTER — OFFICE VISIT (OUTPATIENT)
Dept: PODIATRY | Facility: CLINIC | Age: 74
End: 2024-02-15
Payer: MEDICARE

## 2024-02-15 VITALS
HEIGHT: 68 IN | BODY MASS INDEX: 21.22 KG/M2 | SYSTOLIC BLOOD PRESSURE: 151 MMHG | WEIGHT: 140 LBS | DIASTOLIC BLOOD PRESSURE: 77 MMHG

## 2024-02-15 DIAGNOSIS — Z79.01 CHRONIC ANTICOAGULATION: ICD-10-CM

## 2024-02-15 DIAGNOSIS — I73.9 PERIPHERAL VASCULAR DISEASE, UNSPECIFIED (HCC): ICD-10-CM

## 2024-02-15 DIAGNOSIS — B35.1 ONYCHOMYCOSIS: Primary | ICD-10-CM

## 2024-02-15 PROCEDURE — 11721 DEBRIDE NAIL 6 OR MORE: CPT | Performed by: PODIATRIST

## 2024-02-26 NOTE — PROGRESS NOTES
PATIENT:  Aurelia Romeo  1950    ASSESSMENT:  1. Onychomycosis        2. Peripheral vascular disease, unspecified (HCC)  Nail removal      3. Chronic anticoagulation              Orders Placed This Encounter   Procedures   • Nail removal          PLAN:  The patient was educated in proper foot wear and how this led to her ulceration.  Discussed daily foot assessment and proper foot care.    The patient will follow up in 9-12 weeks for foot exam and care.    Continue with topical antifungal medication for dystrophic nails bilateral.    Procedures:  41283, 87453  All mycotic toenails (x2) were reduced and debrided in length, width, and girth using a nail nipper and dremel.    All non-dystrphic nails (x8) also trimmed.     Patient tolerated procedure(s) well without complications.    Nail removal    Date/Time: 2/15/2024 2:15 PM    Performed by: Godfrey Hinojosa DPM  Authorized by: Godfrey Hinojosa DPM    Patient location:  Other (comment)Universal Protocol:  Consent: Verbal consent obtained.  Risks and benefits: risks, benefits and alternatives were discussed  Consent given by: patient  Patient understanding: patient states understanding of the procedure being performed  Patient identity confirmed: verbally with patient    Nails trimmed:     Number of nails trimmed:  8  Post-procedure details:     Patient tolerance of procedure:  Tolerated well, no immediate complications       HPI:  Aurelia Romeo is a 73 y.o.year old female seen for at risk foot exam and care.  Overall unchanged clinically from last visit.  The patient complained of elongated thick painful toenails.  The patient has class findings with peripheral vascular disease.  Clinically unchanged from prior evaluation.  The patient denied any acute pedal disorder, redness, acute swelling, or recent injury.      The following portions of the patient's history were reviewed and updated as appropriate: allergies, current medications, past family history, past  "medical history, past social history, past surgical history and problem list.    REVIEW OF SYSTEMS:  GENERAL: No fever or chills  HEART: No chest pain, or palpitation  RESPIRATORY:  No acute SOB or cough  GI: No Nausea, vomit or diarrhea  NEUROLOGIC: No syncope or acute weakness    PHYSICAL EXAM:    /77   Ht 5' 8\" (1.727 m)   Wt 63.5 kg (140 lb)   BMI 21.29 kg/m²     VASCULAR EXAM  Posterior tibial artery  absent bilateral.    Dorsalis pedis artery absent bilateral.  The patient has class findings with skin atrophy, lack of digital hair, and nail dystrophy.    There is no lower extremity edema bilaterally.      NEUROLOGIC EXAM  Sensation is intact to light touch.  Sensation is intact to 10gm monofilament.    No focal neurologic deficit.          DERMATOLOGIC EXAM:   No ulcer or cellulitis noted.  Texture, Tone and Turgor are diminished with moderate atrophic changes noted.  The patient has dystrophic/hypertrophic toenails with yellow/white discoloration, onycholysis, and subungal debris.   Fungal odor and brittle nature noted.  Pain with palpation.  Periungual erythema noted.  Right foot nails dystrophic x1 with 0.5 cm ave thickness (#4)  Left foot nails dystrophic x1 with 0.5 cm ave thickness (#3)  Patient has hyperkeratotic lesions noted:   Right foot located at none.  Left foot located at none.  No notable suspicious skin lesions.      MUSCULOSKELETAL EXAM:   No acute joint pain, edema, or redness.  No acute musculoskeletal problem.    Patient has right dropfoot, and mild digital contracture deformities noted.  Limited ambulation with stepping gait secondary to dropfoot.       Risk Category/Class Findings:   Q8(B1, B3)    A1)  Has the patient had a previous amputation of the foot or integral skeletal portion thereof? No  B1)  Does the patient have absent posterior tibial pulse? Yes  B3)  Does the patient have absent dorsalis pedis? Yes  B2)  Does the patient have three of the following? Yes           1.  " Hair growth (increased or decreased), 2.  Nail changes (thickening) and 3.  Pigmentary changes (discoloring)  C)  Does the patient have two of the following and one above? No

## 2024-02-28 ENCOUNTER — NEW PATIENT COMPREHENSIVE (OUTPATIENT)
Dept: URBAN - METROPOLITAN AREA CLINIC 6 | Facility: CLINIC | Age: 74
End: 2024-02-28

## 2024-02-28 DIAGNOSIS — H52.13: ICD-10-CM

## 2024-02-28 DIAGNOSIS — H43.811: ICD-10-CM

## 2024-02-28 DIAGNOSIS — H52.223: ICD-10-CM

## 2024-02-28 DIAGNOSIS — H04.123: ICD-10-CM

## 2024-02-28 PROCEDURE — 92004 COMPRE OPH EXAM NEW PT 1/>: CPT

## 2024-02-28 PROCEDURE — 92015 DETERMINE REFRACTIVE STATE: CPT

## 2024-02-28 ASSESSMENT — VISUAL ACUITY
OS_CC: 20/25-1
OD_CC: 20/20-2
OU_CC: J1+

## 2024-02-28 ASSESSMENT — TONOMETRY
OS_IOP_MMHG: 13
OD_IOP_MMHG: 14

## 2024-04-15 ENCOUNTER — OFFICE VISIT (OUTPATIENT)
Dept: PODIATRY | Facility: CLINIC | Age: 74
End: 2024-04-15
Payer: MEDICARE

## 2024-04-15 VITALS
DIASTOLIC BLOOD PRESSURE: 79 MMHG | BODY MASS INDEX: 20.73 KG/M2 | WEIGHT: 140 LBS | HEART RATE: 76 BPM | SYSTOLIC BLOOD PRESSURE: 161 MMHG | HEIGHT: 69 IN

## 2024-04-15 DIAGNOSIS — M21.371 FOOT DROP, RIGHT: ICD-10-CM

## 2024-04-15 DIAGNOSIS — B35.1 ONYCHOMYCOSIS: Primary | ICD-10-CM

## 2024-04-15 DIAGNOSIS — I73.9 PERIPHERAL VASCULAR DISEASE, UNSPECIFIED (HCC): ICD-10-CM

## 2024-04-15 PROCEDURE — 99213 OFFICE O/P EST LOW 20 MIN: CPT | Performed by: PODIATRIST

## 2024-04-15 NOTE — PROGRESS NOTES
"  PATIENT:  Aurelia Romeo  1950    ASSESSMENT:  1. Onychomycosis  Efinaconazole 10 % SOLN      2. Peripheral vascular disease, unspecified (HCC)        3. Foot drop, right                No orders of the defined types were placed in this encounter.         PLAN:  The patient was educated in proper foot wear and how this led to her ulceration.  Discussed daily foot assessment and proper foot care.    Follow-up in September 2024..    Continue with topical antifungal medication for dystrophic nails bilateral.  Rx Jublia to be applied daily for continued attempt to resolve chronic nail mycosis.    Procedures:   All mycotic toenails (x2) were reduced and debrided in length, width, and girth using a nail nipper and dremel.    All non-dystrphic nails (x8) also trimmed.     Patient tolerated procedure(s) well without complications.    Procedures     HPI:  Aurelia Romeo is a 73 y.o.year old female seen for at risk foot exam and care.  Reports some improvement in nail discoloration from application of Jublia, running out and needs a new prescription.  The patient complained of elongated thick painful toenails.  The patient has class findings with peripheral vascular disease.  Clinically unchanged from prior evaluation.  The patient denied any acute pedal disorder, redness, acute swelling, or recent injury.      The following portions of the patient's history were reviewed and updated as appropriate: allergies, current medications, past family history, past medical history, past social history, past surgical history and problem list.    REVIEW OF SYSTEMS:  GENERAL: No fever or chills  HEART: No chest pain, or palpitation  RESPIRATORY:  No acute SOB or cough  GI: No Nausea, vomit or diarrhea  NEUROLOGIC: No syncope or acute weakness    PHYSICAL EXAM:    /79 (BP Location: Left arm, Patient Position: Sitting, Cuff Size: Adult)   Pulse 76   Ht 5' 9\" (1.753 m) Comment: STATED  Wt 63.5 kg (140 lb)   BMI 20.67 kg/m² "     VASCULAR EXAM  Posterior tibial artery  absent bilateral.    Dorsalis pedis artery absent bilateral.  The patient has class findings with skin atrophy, lack of digital hair, and nail dystrophy.    There is no lower extremity edema bilaterally.      NEUROLOGIC EXAM  Sensation is intact to light touch.  Sensation is intact to 10gm monofilament.    No focal neurologic deficit.          DERMATOLOGIC EXAM:   No ulcer or cellulitis noted.  Texture, Tone and Turgor are diminished with moderate atrophic changes noted.  The patient has dystrophic/hypertrophic toenails with yellow/white discoloration, onycholysis, and subungal debris.  Right fourth and left third are improving.  Fungal odor and brittle nature noted.  Pain with palpation.  Periungual erythema noted.  Right foot nails dystrophic x1 with 0.4 cm ave thickness (#4)  Left foot nails dystrophic x1 with 0.3 cm ave thickness (#3)    Patient has hyperkeratotic lesions noted:   Right foot located at none.  Left foot located at none.  No notable suspicious skin lesions.      MUSCULOSKELETAL EXAM:   No acute joint pain, edema, or redness.  No acute musculoskeletal problem.    Patient has right dropfoot, and mild digital contracture deformities noted.  Limited ambulation with stepping gait secondary to dropfoot.       Risk Category/Class Findings:   Q8(B1, B3)    A1)  Has the patient had a previous amputation of the foot or integral skeletal portion thereof? No  B1)  Does the patient have absent posterior tibial pulse? Yes  B3)  Does the patient have absent dorsalis pedis? Yes  B2)  Does the patient have three of the following? Yes           1.  Hair growth (increased or decreased), 2.  Nail changes (thickening) and 3.  Pigmentary changes (discoloring)  C)  Does the patient have two of the following and one above? No

## 2024-05-21 ENCOUNTER — OFFICE VISIT (OUTPATIENT)
Dept: PODIATRY | Facility: CLINIC | Age: 74
End: 2024-05-21

## 2024-05-21 VITALS
HEIGHT: 69 IN | HEART RATE: 80 BPM | DIASTOLIC BLOOD PRESSURE: 82 MMHG | BODY MASS INDEX: 20.73 KG/M2 | SYSTOLIC BLOOD PRESSURE: 130 MMHG | WEIGHT: 140 LBS

## 2024-05-21 DIAGNOSIS — I73.9 PERIPHERAL VASCULAR DISEASE, UNSPECIFIED (HCC): ICD-10-CM

## 2024-05-21 DIAGNOSIS — B35.1 ONYCHOMYCOSIS: Primary | ICD-10-CM

## 2024-05-21 DIAGNOSIS — M21.371 FOOT DROP, RIGHT: ICD-10-CM

## 2024-05-21 PROCEDURE — NCFTCARE PR NON-COVERED FOOT CARE: Performed by: PODIATRIST

## 2024-05-21 RX ORDER — MULTIVIT-MIN/IRON/FOLIC ACID/K 18-600-40
CAPSULE ORAL
COMMUNITY

## 2024-05-21 RX ORDER — ASCORBIC ACID 100 MG
TABLET,CHEWABLE ORAL
COMMUNITY

## 2024-06-24 NOTE — PROGRESS NOTES
"  PATIENT:  Aurelia Romeo  1950    ASSESSMENT:  1. Onychomycosis        2. Peripheral vascular disease, unspecified (HCC)        3. Foot drop, right                  No orders of the defined types were placed in this encounter.         PLAN:  The patient was educated in proper foot wear and how this led to her ulceration.  Discussed daily foot assessment and proper foot care.    Follow-up in 6 weeks  Continue with topical antifungal medication for dystrophic nails bilateral.    Procedures:   All mycotic toenails (x2) were reduced and debrided in length, width, and girth using a nail nipper and dremel.    All non-dystrphic nails (x8) also trimmed.     Patient tolerated procedure(s) well without complications.    Procedures     HPI:  Aurelia Romeo is a 73 y.o.year old female seen for at risk foot exam and care.  Reports some improvement in nail discoloration from application of Jublia, running out and needs a new prescription.  The patient complained of elongated thick painful toenails.  Services are noncovered routine footcare.  The following portions of the patient's history were reviewed and updated as appropriate: allergies, current medications, past family history, past medical history, past social history, past surgical history and problem list.    REVIEW OF SYSTEMS:  GENERAL: No fever or chills  HEART: No chest pain, or palpitation  RESPIRATORY:  No acute SOB or cough  GI: No Nausea, vomit or diarrhea  NEUROLOGIC: No syncope or acute weakness    PHYSICAL EXAM:    /82 (BP Location: Left arm, Patient Position: Sitting, Cuff Size: Adult)   Pulse 80   Ht 5' 9\" (1.753 m) Comment: STATED  Wt 63.5 kg (140 lb)   BMI 20.67 kg/m²     VASCULAR EXAM  Posterior tibial artery  absent bilateral.    Dorsalis pedis artery absent bilateral.  The patient has class findings with skin atrophy, lack of digital hair, and nail dystrophy.    There is no lower extremity edema bilaterally.      NEUROLOGIC EXAM  Sensation " is intact to light touch.  Sensation is intact to 10gm monofilament.    No focal neurologic deficit.          DERMATOLOGIC EXAM:   No ulcer or cellulitis noted.  Texture, Tone and Turgor are diminished with moderate atrophic changes noted.  The patient has dystrophic/hypertrophic toenails with yellow/white discoloration, onycholysis, and subungal debris.  Right fourth and left third are improving.  Fungal odor and brittle nature noted.  Pain with palpation.  Periungual erythema noted.  Right foot nails dystrophic x1 with 0.4 cm ave thickness (#4)  Left foot nails dystrophic x1 with 0.3 cm ave thickness (#3)    Patient has hyperkeratotic lesions noted:   Right foot located at none.  Left foot located at none.  No notable suspicious skin lesions.      MUSCULOSKELETAL EXAM:   No acute joint pain, edema, or redness.  No acute musculoskeletal problem.    Patient has right dropfoot, and mild digital contracture deformities noted.  Limited ambulation with stepping gait secondary to dropfoot.       Risk Category/Class Findings:   Q8(B1, B3)    A1)  Has the patient had a previous amputation of the foot or integral skeletal portion thereof? No  B1)  Does the patient have absent posterior tibial pulse? Yes  B3)  Does the patient have absent dorsalis pedis? Yes  B2)  Does the patient have three of the following? Yes           1.  Hair growth (increased or decreased), 2.  Nail changes (thickening) and 3.  Pigmentary changes (discoloring)  C)  Does the patient have two of the following and one above? No

## 2024-07-02 ENCOUNTER — TELEPHONE (OUTPATIENT)
Dept: GASTROENTEROLOGY | Facility: CLINIC | Age: 74
End: 2024-07-02

## 2024-07-02 ENCOUNTER — OFFICE VISIT (OUTPATIENT)
Dept: GASTROENTEROLOGY | Facility: CLINIC | Age: 74
End: 2024-07-02
Payer: MEDICARE

## 2024-07-02 VITALS
HEART RATE: 76 BPM | SYSTOLIC BLOOD PRESSURE: 185 MMHG | HEIGHT: 69 IN | BODY MASS INDEX: 20.73 KG/M2 | WEIGHT: 140 LBS | DIASTOLIC BLOOD PRESSURE: 85 MMHG

## 2024-07-02 DIAGNOSIS — D12.3 ADENOMATOUS POLYP OF TRANSVERSE COLON: ICD-10-CM

## 2024-07-02 DIAGNOSIS — K57.90 DIVERTICULAR DISEASE: ICD-10-CM

## 2024-07-02 DIAGNOSIS — K64.5 EXTERNAL HEMORRHOID, THROMBOSED: Primary | ICD-10-CM

## 2024-07-02 DIAGNOSIS — K64.1 GRADE II HEMORRHOIDS: ICD-10-CM

## 2024-07-02 DIAGNOSIS — Z86.010 PERSONAL HISTORY OF COLONIC POLYPS: ICD-10-CM

## 2024-07-02 PROCEDURE — 99203 OFFICE O/P NEW LOW 30 MIN: CPT | Performed by: INTERNAL MEDICINE

## 2024-07-02 NOTE — TELEPHONE ENCOUNTER
Per Dr Calvillo, we are to double book this pt for today. I attempted to call pt but she was unavailable. If pt calls back please forward  call to ariana at [place] [phone] and I will get her scheduled. Thanks Ariana   
Never smoker

## 2024-07-02 NOTE — PROGRESS NOTES
[facility] [place] - Outpatient Consultation  Aurelia Romeo 74 y.o. female MRN: [id number]  Encounter: [id number]          ASSESSMENT AND PLAN:      1. External hemorrhoid, thrombosed  2. Grade II hemorrhoids  3. Personal history of colonic polyps  4. Diverticular disease  5. Adenomatous polyp of transverse colon    Patient with a feeling of a lump like sensation in the rectal area, on examination 5 mm sized thrombosed external hemorrhoid, posterior part of the anus, nontender, digital exam no blood.  No prolapsed hemorrhoid.  Anoscopy grade 2 hemorrhoid right anterior.  No other significant pathology seen.  Since there is no pain bleeding or tenderness, I do not see the need for doing anything different at this time.  Avoid straining, take adequate fiber in the diet.  Prior colonoscopy results reports reviewed, had sessile serrated adenoma polyp of descending colon.  Advised her to follow-up with her proceduralist for follow-up procedure.  ______________________________________________________________________    HPI:    Patient came in for evaluation of her possible hemorrhoid.  She had some discomfort in the anorectal area felt small lump, no more painful.  Denies any blood in stools melena hematochezia, bowels sometimes irregular denies any excessive straining or constipation.  Denies any chest pain shortness of breath fever chills rash or significant upper GI symptoms.  Reasonably active alert independent for her age.  Diet medications on the 10 pertinent systems reviewed.      REVIEW OF SYSTEMS:    CONSTITUTIONAL: Denies any fever, chills, rigors, and weight loss.  HEENT: No earache or tinnitus.  CARDIOVASCULAR: No chest pain or palpitations.   RESPIRATORY: Denies any cough, hemoptysis, shortness of breath or dyspnea on exertion.  GASTROINTESTINAL: As noted in the History of Present Illness.   GENITOURINARY: Denies any hematuria or dysuria.  NEUROLOGIC: No dizziness or vertigo.    MUSCULOSKELETAL: Denies any joint swellings.  SKIN: Denies skin rashes or itching.   ENDOCRINE: Denies excessive thirst. Denies intolerance to heat or cold.  PSYCHOSOCIAL: Denies depression or anxiety. Denies any recent memory loss.       Historical Information   Past Medical History:   Diagnosis Date   • Asthma    • Colon polyp    • DVT (deep venous thrombosis) (HCC)     right calf   • Infectious viral hepatitis     history Hep A     Past Surgical History:   Procedure Laterality Date   • APPENDECTOMY     • BACK SURGERY     • COLONOSCOPY     • HYSTERECTOMY     • JOINT REPLACEMENT Left     x2   • KNEE ARTHROPLASTY     • OOPHORECTOMY      pt states age 31   • TONSILLECTOMY       Social History   Social History     Substance and Sexual Activity   Alcohol Use Yes   • Alcohol/week: 7.0 standard drinks of alcohol   • Types: 7 Glasses of wine per week    Comment: glass of wine with dinner       Social History     Substance and Sexual Activity   Drug Use Never     Social History     Tobacco Use   Smoking Status Never   Smokeless Tobacco Never     Family History   Problem Relation Age of Onset   • No Known Problems Mother    • Melanoma Father 67   • Breast cancer Sister 69 - 70   • No Known Problems Daughter    • No Known Problems Maternal Grandmother    • No Known Problems Maternal Grandfather    • No Known Problems Paternal Grandmother    • No Known Problems Paternal Grandfather    • No Known Problems Maternal Aunt    • No Known Problems Paternal Aunt    • No Known Problems Paternal Aunt    • Melanoma Paternal Aunt         age unknown   • Breast cancer Cousin          at age 32   • Colon cancer Maternal Uncle         guessing 60's       Meds/Allergies       Current Outpatient Medications:   •  Apoaequorin (PREVAGEN PO)  •  Ascorbic Acid (Vitamin C) 100 MG CHEW  •  ascorbic acid (VITAMIN C) 500 mg tablet  •  Aspirin 81 MG CAPS  •  B Complex Vitamins (B COMPLEX 1 PO)  •  Boswellia Santos (BOSWELLIA PO)  •   "Cholecalciferol (Vitamin D) 50 MCG (2000 UT) CAPS  •  cholecalciferol (VITAMIN D3) 400 units tablet  •  Coenzyme Q10 60 MG CAPS  •  cyanocobalamin (VITAMIN B-12) 500 mcg tablet  •  Misc Natural Products (CURCUMAX PRO PO)  •  Multiple Vitamins-Minerals (SUPER OMAR MARIANO 75/BETA BRANDON PO)  •  Phosphatidylserine (NEURO-PS PO)  •  TURMERIC PO  •  amoxicillin (AMOXIL) 500 mg capsule  •  apixaban (ELIQUIS) 5 mg  •  conjugated estrogens (Premarin) 0.625 mg tablet  •  Efinaconazole 10 % SOLN  •  Estradiol (Divigel) 0.25 MG/0.25GM GEL  •  estradiol (ESTRACE VAGINAL) 0.1 mg/g vaginal cream  •  estrogens, conjugated (Premarin) vaginal cream  •  mupirocin (BACTROBAN) 2 % ointment  •  Premarin 0.625 MG tablet    Allergies   Allergen Reactions   • Vancomycin Itching   • Iodinated Contrast Media Swelling   • Iodine - Food Allergy    • Minocycline Itching   • Tetanus Toxoids Other (See Comments)           Objective     Blood pressure (!) 185/85, pulse 76, height 5' 9\" (1.753 m), weight 63.5 kg (140 lb). Body mass index is 20.67 kg/m².        PHYSICAL EXAM:      General Appearance:   Alert, cooperative, no distress   HEENT:   Normocephalic, atraumatic, anicteric.     Neck:  Supple, symmetrical, trachea midline   Lungs:   Clear to auscultation bilaterally; no rales, rhonchi or wheezing; respirations unlabored    Heart::   Regular rate and rhythm; no murmur.   Abdomen:   Soft, non-tender, non-distended; normal bowel sounds; no masses, no organomegaly    Genitalia:   Deferred    Rectal:   As above   Extremities:  No cyanosis, clubbing or edema    Skin:  No jaundice, rashes, or lesions    Lymph nodes:  No palpable cervical lymphadenopathy        Lab Results:   No visits with results within 1 Day(s) from this visit.   Latest known visit with results is:   Annual Exam on 07/10/2023   Component Date Value   • Case Report 07/10/2023                      Value:Gynecologic Cytology Report                       Case: [id number]                   "                Authorizing Provider:  [place] MD        Collected:           07/10/2023 1312              Ordering Location:     [facility] [place]    Received:            07/10/2023 1312                                     Area                                                                         First Screen:          Mckenna Terrell                                                                Rescreen:              Nasreen Vallejo                                                               Specimen:    LIQUID-BASED PAP, SCREENING, Vaginal Cuff                                                 • Primary Interpretation 07/10/2023 Negative for intraepithelial lesion or malignancy    • Specimen Adequacy 07/10/2023 Satisfactory for evaluation.    • Additional Information 07/10/2023                      Value:[employer] FDA approved ,  and ThinPrep Imaging Duo System are                           utilized with strict adherence to the 's instruction manual to                           prepare gynecologic and non-gynecologic cytology specimens for the                           production of ThinPrep slides as well as for gynecologic ThinPrep imaging.                           These processes have been validated by our laboratory and/or by the                           .                          The Pap test is not a diagnostic procedure and should not be used as the                           sole means to detect cervical cancer. It is only a screening procedure to                           aid in the detection of cervical cancer and its precursors. Both                           false-negative and false-positive results have been experienced. Your                           patient's test result should be interpreted in this context together with                           the history and clinical findings.   • LMP 07/10/2023           Radiology Results:   No results found.

## 2024-07-03 ENCOUNTER — TELEPHONE (OUTPATIENT)
Age: 74
End: 2024-07-03

## 2024-07-03 ENCOUNTER — NURSE TRIAGE (OUTPATIENT)
Age: 74
End: 2024-07-03

## 2024-07-03 DIAGNOSIS — K64.1 GRADE II HEMORRHOIDS: ICD-10-CM

## 2024-07-03 DIAGNOSIS — K64.5 EXTERNAL HEMORRHOID, THROMBOSED: Primary | ICD-10-CM

## 2024-07-03 RX ORDER — HYDROCORTISONE 25 MG/G
CREAM TOPICAL 2 TIMES DAILY
Qty: 30 G | Refills: 2 | Status: SHIPPED | OUTPATIENT
Start: 2024-07-03

## 2024-07-03 NOTE — TELEPHONE ENCOUNTER
"Pharmacy calling stating they never received script for Anusol cream, e-scribed confirmation today at 227 pm but pharmacy never received script, pharmacy reporting having an issue with phone system today, gave verbal order over the phone to pharmacist   Reason for Disposition   Prescription prescribed recently is not at pharmacy and triager has access to patient's EMR and prescription is recorded in the EMR    Answer Assessment - Initial Assessment Questions  1. NAME of MEDICATION: \"What medicine are you calling about?\"        Pharmacy calling stating they never received script for anusol cream, verbal order given    Protocols used: Medication Question Call-ADULT-OH    "

## 2024-07-03 NOTE — TELEPHONE ENCOUNTER
Patients GI provider:  Dr. Calvillo    Number to return call: [phone]    Reason for call: Pt called stating pt called [facility] and they did not receive for hemorrhoid medication.     Scheduled procedure/appointment date if applicable: 7/30/2024

## 2024-07-03 NOTE — TELEPHONE ENCOUNTER
I called and spoke to patient and advised her that her prescription was sent to the pharmacy. She verbalized understanding.

## 2024-07-18 ENCOUNTER — OFFICE VISIT (OUTPATIENT)
Dept: PODIATRY | Facility: CLINIC | Age: 74
End: 2024-07-18
Payer: MEDICARE

## 2024-07-18 VITALS
BODY MASS INDEX: 20.73 KG/M2 | HEIGHT: 69 IN | SYSTOLIC BLOOD PRESSURE: 148 MMHG | DIASTOLIC BLOOD PRESSURE: 78 MMHG | WEIGHT: 140 LBS

## 2024-07-18 DIAGNOSIS — M21.371 FOOT DROP, RIGHT: Primary | ICD-10-CM

## 2024-07-18 DIAGNOSIS — Z79.01 CHRONIC ANTICOAGULATION: ICD-10-CM

## 2024-07-18 PROCEDURE — 99213 OFFICE O/P EST LOW 20 MIN: CPT | Performed by: PODIATRIST

## 2024-07-19 ENCOUNTER — TELEPHONE (OUTPATIENT)
Age: 74
End: 2024-07-19

## 2024-07-19 NOTE — TELEPHONE ENCOUNTER
Caller: Aurelia Romeo    Doctor: Ashish    Reason for call: Has some questions about the coding of her bill.  Can someone please reach out to her?  Thank you.     Call back#: [phone]

## 2024-07-23 NOTE — TELEPHONE ENCOUNTER
Caller: Patient     Doctor: Ashish     Reason for call: Patient returning call please contact patient     Call back#: [phone]

## 2024-08-14 ENCOUNTER — APPOINTMENT (EMERGENCY)
Dept: RADIOLOGY | Facility: HOSPITAL | Age: 74
End: 2024-08-14
Payer: MEDICARE

## 2024-08-14 ENCOUNTER — HOSPITAL ENCOUNTER (EMERGENCY)
Facility: HOSPITAL | Age: 74
Discharge: HOME | End: 2024-08-14
Attending: EMERGENCY MEDICINE
Payer: MEDICARE

## 2024-08-14 VITALS
DIASTOLIC BLOOD PRESSURE: 86 MMHG | HEART RATE: 68 BPM | HEIGHT: 69 IN | OXYGEN SATURATION: 96 % | WEIGHT: 144 LBS | TEMPERATURE: 97.8 F | RESPIRATION RATE: 15 BRPM | SYSTOLIC BLOOD PRESSURE: 168 MMHG | BODY MASS INDEX: 21.33 KG/M2

## 2024-08-14 DIAGNOSIS — R07.9 CHEST PAIN, UNSPECIFIED: Primary | ICD-10-CM

## 2024-08-14 LAB
ALBUMIN SERPL BCG-MCNC: 4.4 G/DL (ref 3.5–5)
ALP SERPL-CCNC: 86 U/L (ref 34–104)
ALT SERPL W P-5'-P-CCNC: 13 U/L (ref 7–52)
ANION GAP SERPL CALCULATED.3IONS-SCNC: 6 MMOL/L (ref 4–13)
AST SERPL W P-5'-P-CCNC: 15 U/L (ref 13–39)
BASOPHILS # BLD AUTO: 0.05 THOUSANDS/ÂΜL (ref 0–0.1)
BASOPHILS NFR BLD AUTO: 1 % (ref 0–1)
BILIRUB SERPL-MCNC: 0.47 MG/DL (ref 0.2–1)
BUN SERPL-MCNC: 14 MG/DL (ref 5–25)
CALCIUM SERPL-MCNC: 9.2 MG/DL (ref 8.4–10.2)
CARDIAC TROPONIN I PNL SERPL HS: <2 NG/L
CARDIAC TROPONIN I PNL SERPL HS: <2 NG/L
CHLORIDE SERPL-SCNC: 104 MMOL/L (ref 96–108)
CO2 SERPL-SCNC: 27 MMOL/L (ref 21–32)
CREAT SERPL-MCNC: 0.51 MG/DL (ref 0.6–1.3)
EOSINOPHIL # BLD AUTO: 0.12 THOUSAND/ÂΜL (ref 0–0.61)
EOSINOPHIL NFR BLD AUTO: 1 % (ref 0–6)
ERYTHROCYTE [DISTWIDTH] IN BLOOD BY AUTOMATED COUNT: 12.7 % (ref 11.6–15.1)
GFR SERPL CREATININE-BSD FRML MDRD: 94 ML/MIN/1.73SQ M
GLUCOSE SERPL-MCNC: 113 MG/DL (ref 65–140)
HCT VFR BLD AUTO: 42 % (ref 34.8–46.1)
HGB BLD-MCNC: 13.8 G/DL (ref 11.5–15.4)
IMM GRANULOCYTES # BLD AUTO: 0.04 THOUSAND/UL (ref 0–0.2)
IMM GRANULOCYTES NFR BLD AUTO: 1 % (ref 0–2)
LYMPHOCYTES # BLD AUTO: 2.11 THOUSANDS/ÂΜL (ref 0.6–4.47)
LYMPHOCYTES NFR BLD AUTO: 25 % (ref 14–44)
MCH RBC QN AUTO: 28.6 PG (ref 26.8–34.3)
MCHC RBC AUTO-ENTMCNC: 32.9 G/DL (ref 31.4–37.4)
MCV RBC AUTO: 87 FL (ref 82–98)
MONOCYTES # BLD AUTO: 0.5 THOUSAND/ÂΜL (ref 0.17–1.22)
MONOCYTES NFR BLD AUTO: 6 % (ref 4–12)
NEUTROPHILS # BLD AUTO: 5.55 THOUSANDS/ÂΜL (ref 1.85–7.62)
NEUTS SEG NFR BLD AUTO: 66 % (ref 43–75)
NRBC BLD AUTO-RTO: 0 /100 WBCS
PLATELET # BLD AUTO: 316 THOUSANDS/UL (ref 149–390)
PMV BLD AUTO: 8.6 FL (ref 8.9–12.7)
POTASSIUM SERPL-SCNC: 4 MMOL/L (ref 3.5–5.3)
PROT SERPL-MCNC: 7.2 G/DL (ref 6.4–8.4)
RBC # BLD AUTO: 4.82 MILLION/UL (ref 3.81–5.12)
SODIUM SERPL-SCNC: 137 MMOL/L (ref 135–147)
WBC # BLD AUTO: 8.37 THOUSAND/UL (ref 4.31–10.16)

## 2024-08-14 PROCEDURE — 85025 COMPLETE CBC W/AUTO DIFF WBC: CPT | Performed by: EMERGENCY MEDICINE

## 2024-08-14 PROCEDURE — 71045 X-RAY EXAM CHEST 1 VIEW: CPT

## 2024-08-14 PROCEDURE — 80053 COMPREHEN METABOLIC PANEL: CPT | Performed by: EMERGENCY MEDICINE

## 2024-08-14 PROCEDURE — 93005 ELECTROCARDIOGRAM TRACING: CPT

## 2024-08-14 PROCEDURE — 84484 ASSAY OF TROPONIN QUANT: CPT | Performed by: EMERGENCY MEDICINE

## 2024-08-14 PROCEDURE — 36415 COLL VENOUS BLD VENIPUNCTURE: CPT

## 2024-08-14 PROCEDURE — 99285 EMERGENCY DEPT VISIT HI MDM: CPT

## 2024-08-14 PROCEDURE — 99285 EMERGENCY DEPT VISIT HI MDM: CPT | Performed by: EMERGENCY MEDICINE

## 2024-08-14 NOTE — DISCHARGE INSTRUCTIONS
Today we assessed you for your chest pain.  An EKG was completed which showed nonspecific changes but no specific signs for acute cardiac injury.  A blood level called troponin was completed which was negative on first and on repeat which is reassuring that no heart injury was occurring.  Your other lab work was very reassuring for no electrolyte abnormalities, no infections, no kidney issues.  At this time we strongly suggest following up with cardiology.  A referral has been given as well as a number for the cardiologist here at [facility].  Please contact your primary care provider today to let her know about your ED visit and continued care management for further advice.

## 2024-08-14 NOTE — ED ATTENDING ATTESTATION
8/14/2024  I, Chau Guevara MD, saw and evaluated the patient. I have discussed the patient with the resident/non-physician practitioner and agree with the resident's/non-physician practitioner's findings, Plan of Care, and MDM as documented in the resident's/non-physician practitioner's note, except where noted. All available labs and Radiology studies were reviewed.  I was present for key portions of any procedure(s) performed by the resident/non-physician practitioner and I was immediately available to provide assistance.       At this point I agree with the current assessment done in the Emergency Department.  I have conducted an independent evaluation of this patient a history and physical is as follows:    74-year-old female, presenting with chest pain.  Patient reporting pain since last night.  Denies any associated shortness of breath.  Patient states she exercises frequently, denies any symptoms or difficulty with exertion.  On exam, patient appears comfortable in no distress, normal respiratory effort.  Noted to be normal sinus rhythm on cardiac monitor.    ED Course  ED Course as of 08/14/24 1524   Wed Aug 14, 2024   1524 Radiology read of chest x-ray reports right lower lobe opacity, possibly pneumonia.  Patient with no fevers, cough, shortness of breath, no current concern for pneumonia.     EKG and labs reviewed, repeat troponin obtained, no acute abnormalities.  Patient instructed to follow-up with her doctors, follow-up or return for any worsening or new concerning symptoms.    Critical Care Time  ECG 12 Lead Documentation Only    Date/Time: 8/14/2024 12:01 PM    Performed by: Chau Guevara MD  Authorized by: Chau Guevara MD    ECG reviewed by me, the ED Provider: yes    Rate:     ECG rate assessment: normal    Rhythm:     Rhythm: sinus rhythm    Ectopy:     Ectopy: none    QRS:     QRS axis:  Normal    QRS intervals:  Normal  Conduction:     Conduction: normal    Comments:      Sinus  rhythm at 78, no acute ischemic changes

## 2024-08-14 NOTE — ED PROVIDER NOTES
History  Chief Complaint   Patient presents with    Chest Pain     Since 11pm last night . Call from pcp for T depressions on EKG     74-year-old female with significant history for asthma presenting to the ED with chest pain since 11 PM last night.  Patient states that the pain is central and dull in nature.  Patient states the pain does not radiate however she also has pain mildly in her left arm that feels sore.  Patient patient denies shortness of breath, difficulty breathing.  Patient states that pain has improved since being here in the ED.  Patient was seen by primary care provider this morning who did an EKG and noted nonspecific ST abnormalities on the EKG and suggested the patient to come to the ER to be reassessed.  While in triage, patient had lab work and EKG drawn.  Patient states that she has had no recent fevers, chills.  Patient denies shortness of breath, difficulty breathing, palpitations, abdominal pain, nausea, vomiting, diarrhea, headache, lightheadedness, dizziness.  Patient does mention that she exercises every other day.  And as long exercises for her asthma.        Prior to Admission Medications   Prescriptions Last Dose Informant Patient Reported? Taking?   Apoaequorin (PREVAGEN PO)  Self Yes No   Sig: Take by mouth   Ascorbic Acid (Vitamin C) 100 MG CHEW  Self Yes No   Aspirin 81 MG CAPS  Self Yes No   B Complex Vitamins (B COMPLEX 1 PO)  Self Yes No   Boswellia Santos (BOSWELLIA PO)  Self Yes No   Sig: Take by mouth   Cholecalciferol (Vitamin D) 50 MCG (2000 UT) CAPS  Self Yes No   Coenzyme Q10 60 MG CAPS  Self Yes No   Sig: Take by mouth daily   Efinaconazole 10 % SOLN  Self No No   Sig: Apply 1 Application topically daily   Estradiol (Divigel) 0.25 MG/0.25GM GEL  Self No No   Sig: Place 1 Application on the skin in the morning   Misc Natural Products (CURCUMAX PRO PO)  Self Yes No   Sig: Take by mouth   Multiple Vitamins-Minerals (SUPER OMAR MARIANO 75/BETA BRANDON PO)  Self Yes No   Sig:  Take by mouth   Phosphatidylserine (NEURO-PS PO)  Self Yes No   Sig: Take by mouth   Premarin 0.625 MG tablet  Self No No   Sig: TAKE 1 TABLET BY MOUTH DAILY   Patient not taking: Reported on 4/11/2023   TURMERIC PO  Self Yes No   Sig: Take by mouth   amoxicillin (AMOXIL) 500 mg capsule  Self Yes No   Sig: amoxicillin 500 mg capsule   take 4 tablets by mouth, 30-60 mins prior to dental procedure   Patient not taking: Reported on 1/11/2024   apixaban (ELIQUIS) 5 mg  Self Yes No   Sig: Take 5 mg by mouth 2 (two) times a day   ascorbic acid (VITAMIN C) 500 mg tablet  Self Yes No   Sig: Take 500 mg by mouth daily   cholecalciferol (VITAMIN D3) 400 units tablet  Self Yes No   Sig: Take 400 Units by mouth daily   conjugated estrogens (Premarin) 0.625 mg tablet   No No   Sig: Take 1 tablet (0.625 mg total) by mouth daily Take daily for 21 days then do not take for 7 days.   cyanocobalamin (VITAMIN B-12) 500 mcg tablet  Self Yes No   Sig: Take 500 mcg by mouth daily   estradiol (ESTRACE VAGINAL) 0.1 mg/g vaginal cream  Self No No   Sig: Insert 0.5 g into the vagina 2 (two) times a week   estrogens, conjugated (Premarin) vaginal cream  Self No No   Sig: Insert 0.5 g into the vagina daily   hydrocortisone (ANUSOL-HC) 2.5 % rectal cream   No No   Sig: Apply topically 2 (two) times a day   mupirocin (BACTROBAN) 2 % ointment  Self No No   Sig: Apply topically every other day Very small thin layer   Patient not taking: Reported on 4/11/2023      Facility-Administered Medications: None       Past Medical History:   Diagnosis Date    Asthma     Colon polyp     DVT (deep venous thrombosis) (HCC)     right calf    Infectious viral hepatitis     history Hep A       Past Surgical History:   Procedure Laterality Date    APPENDECTOMY      BACK SURGERY      COLONOSCOPY      HYSTERECTOMY      JOINT REPLACEMENT Left     x2    KNEE ARTHROPLASTY      OOPHORECTOMY      pt states age 31    TONSILLECTOMY         Family History   Problem Relation  Age of Onset    No Known Problems Mother     Melanoma Father 67    Breast cancer Sister 69 - 70    No Known Problems Daughter     No Known Problems Maternal Grandmother     No Known Problems Maternal Grandfather     No Known Problems Paternal Grandmother     No Known Problems Paternal Grandfather     No Known Problems Maternal Aunt     No Known Problems Paternal Aunt     No Known Problems Paternal Aunt     Melanoma Paternal Aunt         age unknown    Breast cancer Cousin          at age 32    Colon cancer Maternal Uncle         guessing 60's     I have reviewed and agree with the history as documented.    E-Cigarette/Vaping    E-Cigarette Use Never User      E-Cigarette/Vaping Substances    Nicotine No     THC No     CBD No     Flavoring No     Other No     Unknown No      Social History     Tobacco Use    Smoking status: Never    Smokeless tobacco: Never   Vaping Use    Vaping status: Never Used   Substance Use Topics    Alcohol use: Yes     Alcohol/week: 7.0 standard drinks of alcohol     Types: 7 Glasses of wine per week     Comment: glass of wine with dinner      Drug use: Never        Review of Systems   Constitutional:  Negative for chills and fever.   HENT:  Negative for congestion and sinus pressure.    Respiratory:  Negative for chest tightness, shortness of breath and wheezing.    Cardiovascular:  Positive for chest pain. Negative for palpitations and leg swelling.   Gastrointestinal:  Negative for abdominal pain, diarrhea, nausea and vomiting.   Genitourinary:  Negative for dysuria.   Musculoskeletal:  Negative for arthralgias, back pain and myalgias.   Skin:  Negative for color change.   Neurological:  Negative for dizziness, numbness and headaches.   Psychiatric/Behavioral:  Negative for agitation.        Physical Exam  ED Triage Vitals   Temperature Pulse Respirations Blood Pressure SpO2   24 0919 24 0919 24 0919 24 0919 24 0919   97.8 °F (36.6 °C) 77 16 (!) 213/97 97  %      Temp src Heart Rate Source Patient Position - Orthostatic VS BP Location FiO2 (%)   -- 08/14/24 0919 08/14/24 0919 08/14/24 1125 --    Monitor Sitting Right arm       Pain Score       08/14/24 0919       4             Orthostatic Vital Signs  Vitals:    08/14/24 1125 08/14/24 1223 08/14/24 1224 08/14/24 1236   BP: (!) 202/98  (!) 175/79 168/86   Pulse: 77 68     Patient Position - Orthostatic VS:           Physical Exam  Constitutional:       Appearance: She is well-developed.   HENT:      Head: Normocephalic and atraumatic.   Eyes:      Extraocular Movements: Extraocular movements intact.      Pupils: Pupils are equal, round, and reactive to light.   Cardiovascular:      Rate and Rhythm: Normal rate and regular rhythm.      Pulses:           Radial pulses are 2+ on the right side and 2+ on the left side.      Heart sounds: Normal heart sounds.   Pulmonary:      Effort: Pulmonary effort is normal.      Breath sounds: Normal breath sounds.   Abdominal:      General: Bowel sounds are normal.      Palpations: Abdomen is soft.   Musculoskeletal:         General: Normal range of motion.      Cervical back: Normal range of motion.      Right lower leg: No edema.      Left lower leg: No edema.   Skin:     General: Skin is warm.      Capillary Refill: Capillary refill takes less than 2 seconds.   Neurological:      General: No focal deficit present.      Mental Status: She is alert and oriented to person, place, and time.   Psychiatric:         Mood and Affect: Mood normal.         Behavior: Behavior normal.         ED Medications  Medications - No data to display    Diagnostic Studies  Results Reviewed       Procedure Component Value Units Date/Time    HS Troponin I 2hr [[id number]] Collected: 08/14/24 1129    Lab Status: Final result Specimen: Blood from Arm, Right Updated: 08/14/24 1158     hs TnI 2hr <2 ng/L      Delta 2hr hsTnI --    HS Troponin 0hr (reflex protocol) [[id number]]  (Normal) Collected: 08/14/24 0921     Lab Status: Final result Specimen: Blood from Arm, Left Updated: 08/14/24 1000     hs TnI 0hr <2 ng/L     Comprehensive metabolic panel [[id number]]  (Abnormal) Collected: 08/14/24 0921    Lab Status: Final result Specimen: Blood from Arm, Left Updated: 08/14/24 0953     Sodium 137 mmol/L      Potassium 4.0 mmol/L      Chloride 104 mmol/L      CO2 27 mmol/L      ANION GAP 6 mmol/L      BUN 14 mg/dL      Creatinine 0.51 mg/dL      Glucose 113 mg/dL      Calcium 9.2 mg/dL      AST 15 U/L      ALT 13 U/L      Alkaline Phosphatase 86 U/L      Total Protein 7.2 g/dL      Albumin 4.4 g/dL      Total Bilirubin 0.47 mg/dL      eGFR 94 ml/min/1.73sq m     Narrative:      National Kidney Disease Foundation guidelines for Chronic Kidney Disease (CKD):     Stage 1 with normal or high GFR (GFR > 90 mL/min/1.73 square meters)    Stage 2 Mild CKD (GFR = 60-89 mL/min/1.73 square meters)    Stage 3A Moderate CKD (GFR = 45-59 mL/min/1.73 square meters)    Stage 3B Moderate CKD (GFR = 30-44 mL/min/1.73 square meters)    Stage 4 Severe CKD (GFR = 15-29 mL/min/1.73 square meters)    Stage 5 End Stage CKD (GFR <15 mL/min/1.73 square meters)  Note: GFR calculation is accurate only with a steady state creatinine    CBC and differential [[id number]]  (Abnormal) Collected: 08/14/24 0921    Lab Status: Final result Specimen: Blood from Arm, Left Updated: 08/14/24 0938     WBC 8.37 Thousand/uL      RBC 4.82 Million/uL      Hemoglobin 13.8 g/dL      Hematocrit 42.0 %      MCV 87 fL      MCH 28.6 pg      MCHC 32.9 g/dL      RDW 12.7 %      MPV 8.6 fL      Platelets 316 Thousands/uL      nRBC 0 /100 WBCs      Segmented % 66 %      Immature Grans % 1 %      Lymphocytes % 25 %      Monocytes % 6 %      Eosinophils Relative 1 %      Basophils Relative 1 %      Absolute Neutrophils 5.55 Thousands/µL      Absolute Immature Grans 0.04 Thousand/uL      Absolute Lymphocytes 2.11 Thousands/µL      Absolute Monocytes 0.50 Thousand/µL      Eosinophils  Absolute 0.12 Thousand/µL      Basophils Absolute 0.05 Thousands/µL                    XR chest 1 view portable    (Results Pending)         Procedures  ECG 12 Lead Documentation Only    Date/Time: 8/14/2024 11:34 AM    Performed by: Maynor De Jesus MD  Authorized by: Maynor De Jesus MD    Indications / Diagnosis:  Chest pain  ECG reviewed by me, the ED Provider: yes    Patient location:  ED  Previous ECG:     Previous ECG:  Compared to current    Similarity:  No change  Interpretation:     Interpretation: normal    Rate:     ECG rate:  78    ECG rate assessment: normal    Rhythm:     Rhythm: sinus rhythm    Ectopy:     Ectopy: none    QRS:     QRS axis:  Normal    QRS intervals:  Normal  Conduction:     Conduction: normal    ST segments:     ST segments:  Non-specific    Depression:  II and V5  T waves:     T waves: normal          ED Course  ED Course as of 08/14/24 1324   Wed Aug 14, 2024   1115 11 pm last night, central pain, little bit of pain in arm.  Aerobics on monday   1125 Comprehensive metabolic panel(!)  CMP shows mildly decreased creatinine otherwise unremarkable.   1128 CBC and differential(!)  Mildly decreased MPV otherwise unremarkable CBC   1128 hs TnI 0hr: <2  Reassuring will delta.   1128 EKG shows nonspecific ST changes.  No signs of life-threatening arrhythmia.  Reassuring EKG.   1128 Labs were sent prior to physician assessment.  On physician assessment, patient states that symptoms have since resolved.   1204 hs TnI 2hr: <2  Reassuring   1224 Patient reassessed and feeling better.  Delta Trope was negative.  Patient's blood pressure is consistently improving.  Patient has no complaint of chest pain or shortness of breath at this time.  Patient comfortable with discharge.             HEART Risk Score      Flowsheet Row Most Recent Value   Heart Score Risk Calculator    History 0 Filed at: 08/14/2024 1226   ECG 1 Filed at: 08/14/2024 1226   Age 2 Filed at: 08/14/2024 1226   Risk Factors 0  Filed at: 08/14/2024 1226   Troponin 0 Filed at: 08/14/2024 1226   HEART Score 3 Filed at: 08/14/2024 1226                        SBIRT 20yo+      Flowsheet Row Most Recent Value   Initial Alcohol Screen: US AUDIT-C     1. How often do you have a drink containing alcohol? 0 Filed at: 08/14/2024 0921   2. How many drinks containing alcohol do you have on a typical day you are drinking?  0 Filed at: 08/14/2024 0921   3b. FEMALE Any Age, or MALE 65+: How often do you have 4 or more drinks on one occassion? 0 Filed at: 08/14/2024 0921   Audit-C Score 0 Filed at: 08/14/2024 0921   ASTRID: How many times in the past year have you...    Used an illegal drug or used a prescription medication for non-medical reasons? Never Filed at: 08/14/2024 0921                  Medical Decision Making  74-year-old female presenting to the ED with chest pain.    Differential includes ACS, MI, arrhythmia, musculoskeletal pain, GERD.    CBC, CMP, troponin, 2-hour troponin ordered  EKG completed and EKG appreciated from primary care provider's office.    See hospital ED course for interpretation of labs and EKGs.    Patient's blood pressure significantly improved during ED stay.  Delta Trope was 0 with troponin being under 2 ultimately had a heart score of 3 at this time.  Patient is asymptomatic and has been stable throughout ED stay.  Patient feeling comfortable for discharge at this time.  Unspecified chest pain at this time.  Patient will be referred to cardiology for follow-up.  Patient also counseled on following up with her primary care provider.  Patient states that she will call her primary and cardiology today.    Patient discharged.    Amount and/or Complexity of Data Reviewed  Labs: ordered. Decision-making details documented in ED Course.  Radiology: ordered.          Disposition  Final diagnoses:   Chest pain, unspecified     Time reflects when diagnosis was documented in both MDM as applicable and the Disposition within this  note       Time User Action Codes Description Comment    8/14/2024 12:24 PM Maynor Godinez Add [R07.9] Chest pain, unspecified           ED Disposition       ED Disposition   Discharge    Condition   Stable    Date/Time   Wed Aug 14, 2024 1224    Comment   Aurelia Romeo discharge to home/self care.                   Follow-up Information       Follow up With Specialties Details Why Contact Info Additional Information    Khalif Gottlieb MD Internal Medicine   [address]  [address]  [place] [address] [phone]       [facility]   [address]  [address]  [facility] [address]  [phone] [address], [place], [place], [address]   [phone]            Discharge Medication List as of 8/14/2024 12:26 PM        CONTINUE these medications which have NOT CHANGED    Details   amoxicillin (AMOXIL) 500 mg capsule amoxicillin 500 mg capsule   take 4 tablets by mouth, 30-60 mins prior to dental procedure, Historical Med      apixaban (ELIQUIS) 5 mg Take 5 mg by mouth 2 (two) times a day, Historical Med      Apoaequorin (PREVAGEN PO) Take by mouth, Historical Med      Ascorbic Acid (Vitamin C) 100 MG CHEW Historical Med      ascorbic acid (VITAMIN C) 500 mg tablet Take 500 mg by mouth daily, Historical Med      Aspirin 81 MG CAPS Historical Med      B Complex Vitamins (B COMPLEX 1 PO) Historical Med      Boswellia Santos (BOSWELLIA PO) Take by mouth, Historical Med      Cholecalciferol (Vitamin D) 50 MCG (2000 UT) CAPS Historical Med      cholecalciferol (VITAMIN D3) 400 units tablet Take 400 Units by mouth daily, Historical Med      Coenzyme Q10 60 MG CAPS Take by mouth daily, Historical Med      cyanocobalamin (VITAMIN B-12) 500 mcg tablet Take 500 mcg by mouth daily, Historical Med      Efinaconazole 10 % SOLN Apply 1 Application topically daily, Starting Mon 4/15/2024, Print      Estradiol (Divigel)  0.25 MG/0.25GM GEL Place 1 Application on the skin in the morning, Starting Tue 9/12/2023, Until Wed 9/11/2024, Normal      estradiol (ESTRACE VAGINAL) 0.1 mg/g vaginal cream Insert 0.5 g into the vagina 2 (two) times a week, Starting Thu 4/13/2023, Normal      estrogens, conjugated (Premarin) vaginal cream Insert 0.5 g into the vagina daily, Starting Wed 4/26/2023, Normal      hydrocortisone (ANUSOL-HC) 2.5 % rectal cream Apply topically 2 (two) times a day, Starting Wed 7/3/2024, Normal      Misc Natural Products (CURCUMAX PRO PO) Take by mouth, Historical Med      Multiple Vitamins-Minerals (SUPER OMAR MARIANO 75/BETA BRNADON PO) Take by mouth, Historical Med      mupirocin (BACTROBAN) 2 % ointment Apply topically every other day Very small thin layer, Starting Wed 3/22/2023, Normal      Phosphatidylserine (NEURO-PS PO) Take by mouth, Historical Med      Premarin 0.625 MG tablet TAKE 1 TABLET BY MOUTH DAILY, Normal      TURMERIC PO Take by mouth, Historical Med               PDMP Review       None             ED Provider  Attending physically available and evaluated Aurelia Romeo. I managed the patient along with the ED Attending.    Electronically Signed by           Maynor De Jesus MD  08/14/24 [id number]       Maynor De Jesus MD  08/14/24 [id number]

## 2024-08-15 LAB
ATRIAL RATE: 78 BPM
P AXIS: 67 DEGREES
PR INTERVAL: 162 MS
QRS AXIS: 39 DEGREES
QRSD INTERVAL: 92 MS
QT INTERVAL: 374 MS
QTC INTERVAL: 426 MS
T WAVE AXIS: 130 DEGREES
VENTRICULAR RATE: 78 BPM

## 2024-08-15 PROCEDURE — 93010 ELECTROCARDIOGRAM REPORT: CPT | Performed by: INTERNAL MEDICINE

## 2024-08-19 NOTE — PROGRESS NOTES
Assessment/Plan:   Current external ankle-foot orthoses brace functioning properly and helps patient walk significantly better.  Chronic nail mycosis clinically unchanged from prior visit.  If brace gives any further trouble such as causing another recurrent breakdown on the dorsum of the foot would recommend a carbon graphite type AFO which functions off a plantar foot plate versus attaching to the external shoe.  Follow-up as scheduled for routine footcare.     Diagnoses and all orders for this visit:    Foot drop, right    Chronic anticoagulation          Subjective:     Patient ID: Aurelia Romeo is a 74 y.o. female.    7/18/2024: 74-year-old female seen today for follow-up evaluation of dropfoot brace right foot.  Reports good progress with this external brace as it is functioning properly and has not had any further breakdown issues on the top of her foot.        Review of Systems   Constitutional: Negative.    HENT: Negative.     Eyes: Negative.    Respiratory: Negative.     Cardiovascular: Negative.    Gastrointestinal: Negative.    Endocrine: Negative.    Genitourinary: Negative.    Musculoskeletal:  Positive for gait problem.        Dropfoot right side   Skin: Negative.    Allergic/Immunologic: Negative.    Neurological:  Positive for weakness.   Hematological: Negative.    Psychiatric/Behavioral: Negative.           Objective:     Physical Exam  Constitutional:       Appearance: Normal appearance.   HENT:      Head: Normocephalic.      Right Ear: External ear normal.      Left Ear: External ear normal.   Eyes:      Pupils: Pupils are equal, round, and reactive to light.   Cardiovascular:      Rate and Rhythm: Normal rate.      Pulses: Normal pulses.   Pulmonary:      Effort: Pulmonary effort is normal.   Musculoskeletal:         General: No tenderness.      Cervical back: Normal range of motion.      Comments: Diminished muscle strength with dropfoot associated on the right side.   Feet:      Right foot:       Protective Sensation: 10 sites tested.  10 sites sensed.      Skin integrity: Skin integrity normal.      Left foot:      Protective Sensation: 10 sites tested.  10 sites sensed.      Skin integrity: Skin integrity normal.   Skin:     General: Skin is warm and dry.      Capillary Refill: Capillary refill takes 2 to 3 seconds.      Comments: Multiple dystrophic nails consistent with mycosis.  Clinically unchanged.  No further breakdown dorsal right midfoot secondary to brace.  No signs of infection.   Neurological:      General: No focal deficit present.      Mental Status: She is alert.   Psychiatric:         Mood and Affect: Mood normal.

## 2024-10-02 ENCOUNTER — OFFICE VISIT (OUTPATIENT)
Dept: PODIATRY | Facility: CLINIC | Age: 74
End: 2024-10-02
Payer: MEDICARE

## 2024-10-02 DIAGNOSIS — B35.1 ONYCHOMYCOSIS: Primary | ICD-10-CM

## 2024-10-02 DIAGNOSIS — M21.371 ACQUIRED RIGHT FOOT DROP: ICD-10-CM

## 2024-10-02 PROCEDURE — 99202 OFFICE O/P NEW SF 15 MIN: CPT | Performed by: PODIATRIST

## 2024-10-25 ENCOUNTER — HOSPITAL ENCOUNTER (OUTPATIENT)
Dept: NON INVASIVE DIAGNOSTICS | Facility: HOSPITAL | Age: 74
Discharge: HOME | End: 2024-10-25
Attending: INTERNAL MEDICINE
Payer: MEDICARE

## 2024-10-25 VITALS
DIASTOLIC BLOOD PRESSURE: 86 MMHG | HEIGHT: 69 IN | HEART RATE: 68 BPM | BODY MASS INDEX: 21.33 KG/M2 | SYSTOLIC BLOOD PRESSURE: 168 MMHG | WEIGHT: 144 LBS

## 2024-10-25 VITALS — HEIGHT: 69 IN | WEIGHT: 144 LBS | BODY MASS INDEX: 21.33 KG/M2

## 2024-10-25 DIAGNOSIS — R07.9 CHEST PAIN, UNSPECIFIED: ICD-10-CM

## 2024-10-25 LAB
AORTIC ROOT: 2.7 CM
APICAL FOUR CHAMBER EJECTION FRACTION: 60 %
ASCENDING AORTA: 3.1 CM
BSA FOR ECHO PROCEDURE: 1.8 M2
E WAVE DECELERATION TIME: 161 MS
E/A RATIO: 0.83
FRACTIONAL SHORTENING: 52 (ref 28–44)
INTERVENTRICULAR SEPTUM IN DIASTOLE (PARASTERNAL SHORT AXIS VIEW): 1.3 CM
INTERVENTRICULAR SEPTUM: 1.3 CM (ref 0.6–1.1)
LAAS-AP2: 17 CM2
LAAS-AP4: 12.9 CM2
LEFT ATRIUM SIZE: 3.2 CM
LEFT ATRIUM VOLUME (MOD BIPLANE): 40 ML
LEFT ATRIUM VOLUME INDEX (MOD BIPLANE): 22.2 ML/M2
LEFT INTERNAL DIMENSION IN SYSTOLE: 2.2 CM (ref 2.1–4)
LEFT VENTRICULAR INTERNAL DIMENSION IN DIASTOLE: 4.6 CM (ref 3.5–6)
LEFT VENTRICULAR POSTERIOR WALL IN END DIASTOLE: 1.2 CM
LEFT VENTRICULAR STROKE VOLUME: 81 ML
LVSV (TEICH): 81 ML
MAX HR PERCENT: 91 %
MAX HR: 133 BPM
MV E'TISSUE VEL-SEP: 7 CM/S
MV PEAK A VEL: 0.59 M/S
MV PEAK E VEL: 49 CM/S
MV STENOSIS PRESSURE HALF TIME: 47 MS
MV VALVE AREA P 1/2 METHOD: 4.68
RA PRESSURE ESTIMATED: 5 MMHG
RATE PRESSURE PRODUCT: NORMAL
RIGHT ATRIUM AREA SYSTOLE A4C: 13.8 CM2
RIGHT VENTRICLE ID DIMENSION: 4 CM
SL CV LEFT ATRIUM LENGTH A2C: 4.9 CM
SL CV LV EF: 60
SL CV PED ECHO LEFT VENTRICLE DIASTOLIC VOLUME (MOD BIPLANE) 2D: 96 ML
SL CV PED ECHO LEFT VENTRICLE SYSTOLIC VOLUME (MOD BIPLANE) 2D: 15 ML
SL CV STRESS RECOVERY BP: NORMAL MMHG
SL CV STRESS RECOVERY HR: 88 BPM
SL CV STRESS RECOVERY O2 SAT: 98 %
SL CV STRESS STAGE REACHED: 2
STRESS ANGINA INDEX: 0
STRESS BASELINE BP: NORMAL MMHG
STRESS BASELINE HR: 73 BPM
STRESS DUKE TREADMILL SCORE: 1
STRESS O2 SAT REST: 99 %
STRESS PEAK HR: 133 BPM
STRESS POST ESTIMATED WORKLOAD: 7 METS
STRESS POST EXERCISE DUR MIN: 6 MIN
STRESS POST EXERCISE DUR SEC: 0 SEC
STRESS POST O2 SAT PEAK: 98 %
STRESS POST PEAK BP: 158 MMHG
STRESS ST DEPRESSION: 1 MM
TRICUSPID ANNULAR PLANE SYSTOLIC EXCURSION: 1.8 CM

## 2024-10-25 PROCEDURE — 93017 CV STRESS TEST TRACING ONLY: CPT

## 2024-10-25 PROCEDURE — 93018 CV STRESS TEST I&R ONLY: CPT | Performed by: INTERNAL MEDICINE

## 2024-10-25 PROCEDURE — 93306 TTE W/DOPPLER COMPLETE: CPT

## 2024-10-25 PROCEDURE — 93306 TTE W/DOPPLER COMPLETE: CPT | Performed by: INTERNAL MEDICINE

## 2024-10-25 PROCEDURE — 93016 CV STRESS TEST SUPVJ ONLY: CPT | Performed by: INTERNAL MEDICINE

## 2024-10-28 LAB
CHEST PAIN STATEMENT: NORMAL
MAX DIASTOLIC BP: 80 MMHG
MAX PREDICTED HEART RATE: 145 BPM
PROTOCOL NAME: NORMAL
REASON FOR TERMINATION: NORMAL
STRESS POST EXERCISE DUR MIN: 6 MIN
STRESS POST EXERCISE DUR SEC: 0 SEC
STRESS POST PEAK HR: 133 BPM
STRESS POST PEAK SYSTOLIC BP: 164 MMHG
TARGET HR FORMULA: NORMAL
TEST INDICATION: NORMAL

## 2024-10-29 ENCOUNTER — HOSPITAL ENCOUNTER (OUTPATIENT)
Dept: NON INVASIVE DIAGNOSTICS | Facility: HOSPITAL | Age: 74
Discharge: HOME | End: 2024-10-29
Payer: MEDICARE

## 2024-10-29 VITALS
OXYGEN SATURATION: 98 % | HEIGHT: 69 IN | HEART RATE: 77 BPM | DIASTOLIC BLOOD PRESSURE: 80 MMHG | SYSTOLIC BLOOD PRESSURE: 188 MMHG | BODY MASS INDEX: 21.33 KG/M2 | WEIGHT: 144 LBS

## 2024-10-29 DIAGNOSIS — R07.9 CHEST PAIN, UNSPECIFIED TYPE: ICD-10-CM

## 2024-10-29 LAB
BASELINE ST DEPRESSION: 0.5 MM
CHEST PAIN STATEMENT: NORMAL
CHEST PAIN STATEMENT: NORMAL
MAX DIASTOLIC BP: 90 MMHG
MAX DIASTOLIC BP: 90 MMHG
MAX HR PERCENT: 94 %
MAX HR: 137 BPM
MAX PREDICTED HEART RATE: 145 BPM
MAX PREDICTED HEART RATE: 145 BPM
PROTOCOL NAME: NORMAL
PROTOCOL NAME: NORMAL
RATE PRESSURE PRODUCT: NORMAL
REASON FOR TERMINATION: NORMAL
REASON FOR TERMINATION: NORMAL
SL CV LV EF: 65
SL CV STRESS RECOVERY BP: NORMAL MMHG
SL CV STRESS RECOVERY HR: 96 BPM
SL CV STRESS RECOVERY O2 SAT: 100 %
SL CV STRESS STAGE REACHED: 2
STRESS ANGINA INDEX: 0
STRESS BASELINE BP: NORMAL MMHG
STRESS BASELINE HR: 77 BPM
STRESS DUKE TREADMILL SCORE: -1
STRESS O2 SAT REST: 98 %
STRESS PEAK HR: 123 BPM
STRESS POST ESTIMATED WORKLOAD: 5.7 METS
STRESS POST EXERCISE DUR MIN: 3 MIN
STRESS POST EXERCISE DUR SEC: 57 SEC
STRESS POST O2 SAT PEAK: 98 %
STRESS POST PEAK BP: 200 MMHG
STRESS POST PEAK HR: 137 BPM
STRESS POST PEAK HR: 137 BPM
STRESS POST PEAK SYSTOLIC BP: 200 MMHG
STRESS POST PEAK SYSTOLIC BP: 200 MMHG
STRESS ST DEPRESSION: 1 MM
TARGET HR FORMULA: NORMAL
TARGET HR FORMULA: NORMAL
TEST INDICATION: NORMAL
TEST INDICATION: NORMAL

## 2024-10-29 PROCEDURE — 93350 STRESS TTE ONLY: CPT

## 2024-10-29 PROCEDURE — 93350 STRESS TTE ONLY: CPT | Performed by: INTERNAL MEDICINE

## 2025-03-05 ENCOUNTER — ESTABLISHED COMPREHENSIVE EXAM (OUTPATIENT)
Dept: URBAN - METROPOLITAN AREA CLINIC 6 | Facility: CLINIC | Age: 75
End: 2025-03-05

## 2025-03-05 DIAGNOSIS — Z96.1: ICD-10-CM

## 2025-03-05 DIAGNOSIS — H04.123: ICD-10-CM

## 2025-03-05 DIAGNOSIS — H43.811: ICD-10-CM

## 2025-03-05 PROCEDURE — 92134 CPTRZ OPH DX IMG PST SGM RTA: CPT | Mod: NC

## 2025-03-05 PROCEDURE — 92014 COMPRE OPH EXAM EST PT 1/>: CPT

## 2025-03-05 ASSESSMENT — TONOMETRY
OS_IOP_MMHG: 12
OD_IOP_MMHG: 14

## 2025-03-05 ASSESSMENT — VISUAL ACUITY
OD_SC: 20/20
OS_SC: 20/20
OU_CC: J1+

## 2025-03-28 ENCOUNTER — HOSPITAL ENCOUNTER (OUTPATIENT)
Facility: HOSPITAL | Age: 75
End: 2025-03-28
Attending: INTERNAL MEDICINE
Payer: MEDICARE

## 2025-03-28 VITALS — HEIGHT: 69 IN | BODY MASS INDEX: 20.73 KG/M2 | WEIGHT: 140 LBS

## 2025-03-28 DIAGNOSIS — Z12.31 ENCOUNTER FOR SCREENING MAMMOGRAM FOR MALIGNANT NEOPLASM OF BREAST: ICD-10-CM

## 2025-03-28 PROCEDURE — 77063 BREAST TOMOSYNTHESIS BI: CPT

## 2025-03-28 PROCEDURE — 77067 SCR MAMMO BI INCL CAD: CPT

## 2025-08-21 ENCOUNTER — TRANSCRIBE ORDERS (OUTPATIENT)
Dept: LAB | Facility: CLINIC | Age: 75
End: 2025-08-21

## 2025-08-21 DIAGNOSIS — E55.9 VITAMIN D DEFICIENCY: Primary | ICD-10-CM
